# Patient Record
Sex: FEMALE | Race: WHITE | NOT HISPANIC OR LATINO | Employment: FULL TIME | URBAN - NONMETROPOLITAN AREA
[De-identification: names, ages, dates, MRNs, and addresses within clinical notes are randomized per-mention and may not be internally consistent; named-entity substitution may affect disease eponyms.]

---

## 2022-12-05 ENCOUNTER — OFFICE VISIT (OUTPATIENT)
Dept: OBGYN CLINIC | Facility: CLINIC | Age: 37
End: 2022-12-05

## 2022-12-05 DIAGNOSIS — M75.01 ADHESIVE CAPSULITIS OF RIGHT SHOULDER: ICD-10-CM

## 2022-12-05 DIAGNOSIS — M25.511 ACUTE PAIN OF RIGHT SHOULDER: Primary | ICD-10-CM

## 2022-12-05 RX ORDER — NAPROXEN 500 MG/1
500 TABLET ORAL 2 TIMES DAILY WITH MEALS
Qty: 60 TABLET | Refills: 1 | Status: SHIPPED | OUTPATIENT
Start: 2022-12-05

## 2022-12-05 RX ORDER — BUPIVACAINE HYDROCHLORIDE 2.5 MG/ML
4 INJECTION, SOLUTION INFILTRATION; PERINEURAL
Status: COMPLETED | OUTPATIENT
Start: 2022-12-05 | End: 2022-12-05

## 2022-12-05 RX ORDER — TRIAMCINOLONE ACETONIDE 40 MG/ML
40 INJECTION, SUSPENSION INTRA-ARTICULAR; INTRAMUSCULAR
Status: COMPLETED | OUTPATIENT
Start: 2022-12-05 | End: 2022-12-05

## 2022-12-05 RX ADMIN — TRIAMCINOLONE ACETONIDE 40 MG: 40 INJECTION, SUSPENSION INTRA-ARTICULAR; INTRAMUSCULAR at 14:56

## 2022-12-05 RX ADMIN — BUPIVACAINE HYDROCHLORIDE 4 ML: 2.5 INJECTION, SOLUTION INFILTRATION; PERINEURAL at 14:56

## 2022-12-05 NOTE — PROGRESS NOTES
ASSESSMENT/PLAN:    Diagnoses and all orders for this visit:    Acute pain of right shoulder  -     Ambulatory Referral to Physical Therapy; Future    Adhesive capsulitis of right shoulder  -     Ambulatory Referral to Physical Therapy; Future  -     naproxen (NAPROSYN) 500 mg tablet; Take 1 tablet (500 mg total) by mouth 2 (two) times a day with meals        Plan:  Treatment options were discussed  She elected to proceed with injection which was performed without difficulty  I have also recommended physical therapy and a prescription for naproxen was sent to her pharmacy  I will see her in 3-4 weeks for re-evaluation  I have encouraged her to contact me if questions or concerns arise  She will add Tylenol as needed  I did discuss the option of narcotic analgesics for prior to therapy sessions but she would prefer to avoid this at this time  Return for  3-4 weeks  _____________________________________________________  CHIEF COMPLAINT:  Chief Complaint   Patient presents with   • Right Shoulder - Pain         SUBJECTIVE:  Leslie Pimentel is a 44y o  year old right-handed female who presents for evaluation of acute onset of right shoulder pain about 3 weeks ago without injury  She has noted persistent symptoms  She denies any significant pain at rest but has pain with activity  She has had no treatment  She complains of anterior pain describing the pain as deep-seated  She denies radiation of pain distally and denies upper extremity paresthesias  She denies any history of previous shoulder complaints and denies any left shoulder complaints  PAST MEDICAL HISTORY:  Past Medical History:   Diagnosis Date   • Anxiety 2016   • Depression 2016   • GERD (gastroesophageal reflux disease) 2015       PAST SURGICAL HISTORY:  History reviewed  No pertinent surgical history      FAMILY HISTORY:  Family History   Problem Relation Age of Onset   • Hypertension Maternal Grandmother    • Diabetes Maternal Grandmother    • Thyroid disease Maternal Grandmother    • Breast cancer Maternal Grandmother    • Hypertension Maternal Grandfather    • Asthma Maternal Grandfather    • Cancer Maternal Grandfather    • Glaucoma Maternal Grandfather    • Thyroid disease Mother    • Asthma Sister        SOCIAL HISTORY:  Social History     Tobacco Use   • Smoking status: Never   • Smokeless tobacco: Never   Vaping Use   • Vaping Use: Never used   Substance Use Topics   • Alcohol use: Yes     Comment: Socially/infrequently   • Drug use: Never       MEDICATIONS:    Current Outpatient Medications:   •  DULoxetine HCl 30 MG CSDR, Take 30 mg by mouth daily, Disp: 90 capsule, Rfl: 1  •  levonorgestrel (MIRENA) 20 MCG/24HR IUD, 1 each by Intrauterine route, Disp: , Rfl:   •  naproxen (NAPROSYN) 500 mg tablet, Take 1 tablet (500 mg total) by mouth 2 (two) times a day with meals, Disp: 60 tablet, Rfl: 1    ALLERGIES:  No Known Allergies    Review of systems:   Constitutional: Negative for fatigue, fever or loss of apetite  HENT: Negative  Respiratory: Negative for shortness of breath, dyspnea  Cardiovascular: Negative for chest pain/tightness  Gastrointestinal: Negative for abdominal pain, N/V  Endocrine: Negative for cold/heat intolerance, unexplained weight loss/gain  Genitourinary: Negative for flank pain, dysuria, hematuria  Musculoskeletal:  Positive as in the HPI   Skin: Negative for rash  Neurological:  Negative  Psychiatric/Behavioral: Negative for agitation  _____________________________________________________  PHYSICAL EXAMINATION:    Blood pressure (P) 100/72, pulse (P) 66, temperature (P) 98 6 °F (37 °C), weight (P) 67 1 kg (148 lb)      General: well developed and well nourished, alert, oriented times 3 and appears comfortable  Psychiatric: Normal  HEENT: Benign  Cardiovascular: Regular    Pulmonary: No wheezing or stridor  Abdomen: Soft, Nontender  Skin: No masses, erythema, lacerations, fluctation, ulcerations  Neurovascular: Motor and sensory exams are intact and pulses are palpable  MUSCULOSKELETAL EXAMINATION:    The right shoulder exam demonstrates overall good range of motion but increased scapular excursion with shoulder abduction, forward flexion and rotation  She does complain of pain with abduction, forward flexion and with abduction/IR  Passively, she has no more than about 60 degrees of glenohumeral abduction, 70 degrees of forward flexion, 60 degrees of internal rotation and approximately 70 degrees of external rotation  She has pain with Eder's as well as with [place]'s testing  She has no tenderness over the acromioclavicular joint, rotator cuff or biceps  She demonstrates good strength of resisted range of motion despite pain  PROCEDURES:  Large joint arthrocentesis: R subacromial bursa  Universal Protocol:  Consent: Verbal consent obtained    Consent given by: patient  Patient understanding: patient states understanding of the procedure being performed    Supporting Documentation  Indications: pain   Procedure Details  Location: shoulder - R subacromial bursa  Needle size: 22 G  Ultrasound guidance: no  Approach: posterolateral  Medications administered: 4 mL bupivacaine 0 25 %; 40 mg triamcinolone acetonide 40 mg/mL              Ti Bolus

## 2022-12-08 ENCOUNTER — EVALUATION (OUTPATIENT)
Dept: PHYSICAL THERAPY | Facility: CLINIC | Age: 37
End: 2022-12-08

## 2022-12-08 DIAGNOSIS — M75.01 ADHESIVE CAPSULITIS OF RIGHT SHOULDER: ICD-10-CM

## 2022-12-08 DIAGNOSIS — M25.511 ACUTE PAIN OF RIGHT SHOULDER: Primary | ICD-10-CM

## 2022-12-08 NOTE — PROGRESS NOTES
PT Evaluation     Today's date: 2022  Patient name: Loi Pope  : 1985  MRN: [id number]  Referring provider: Shar Farris DO  Dx:   Encounter Diagnosis     ICD-10-CM    1  Adhesive capsulitis of right shoulder  M75 01                      Assessment  Assessment details: Lilia Coronel is a 44 y o female who present to OP PT with signs and symptoms consistent with R Shoulder adhesive capsulitis   Upon examination, PT notes key impairments of decreased ROM/strength in capsular pattern of limitations in shoulder ER, flexion and abduction  Due to this patient is limited with performing ADL/IADLs, reaching overhead, behind back, and lifting heavy objects  Additionally, patient is restricted in participating in attending the gym and recreational activities  Patient will benefit from skilled PT to address above impairments with POC consisting of functional ROM, stretching, strengthening, balance, analgesic modalities and manual therapy in order to maximize functional independence  Thank you for your referral!     Impairments: abnormal or restricted ROM, abnormal movement, impaired physical strength, lacks appropriate home exercise program, pain with function and poor body mechanics  Understanding of Dx/Px/POC: good   Prognosis: good    Goals  STG(In 4 weeks)  Patient will initiate HEP  Patient will decrease shoulder pain from 8/10 to 4/10 in order to improve QOL  Patient will increase shoulder flexion ROM  by 0-5 deg in order to reach shelf height  Patient will increase shoulder abduction ROM by 0-5 deg in order to reach shelf height  Patient will increase shoulder strength by 1 MMT in order to be able to perform ADL/IADLs  Patient will increase FOTO score from 47 to 59 in order to improve QOL  LTG(In 8 weeks)  Patient will decrease shoulder pain from 8/10 to 1/10 in order to improve QOL  Patient will increase shoulder flexion ROM by 5-10 deg in order to reach shelf height     Patient will increase shoulder abduction ROM by 5-10 deg in order to reach shelf height  Patient will increase shoulder strength by 1-2 MMT in order to be able to perform ADL/IADLs  Patient will increase FOTO score from 59 to 70 in order to improve QOL  Patient will be independent with HEP at D/c  Plan  Patient would benefit from: skilled physical therapy  Planned modality interventions: cryotherapy, electrical stimulation/Russian stimulation, TENS and thermotherapy: hydrocollator packs  Planned therapy interventions: home exercise program, joint mobilization, manual therapy, patient education, strengthening, stretching, therapeutic activities, therapeutic exercise and therapeutic training  Frequency: 2x week  Duration in weeks: 8  Treatment plan discussed with: patient        Subjective Evaluation    History of Present Illness  Mechanism of injury: Patient indicated that she began experiencing shoulder pain 3 weeks ago when she fell and hit her back  She stated that her shoulder progressively got worse over the past 3 weeks  She received a cortisone injection on 22 that relieved her symptoms and her shoulder ROM/strength has improved  Due to this she is still experiencing pain and difficulty reach overhead and behind her back              Quality of life: good    Pain  Current pain ratin  At best pain ratin  At worst pain ratin  Location: Anterior portion of right shoulder   Quality: discomfort and tight  Relieving factors: heat and medications  Aggravating factors: overhead activity and lifting    Social Support    Employment status: working (medical coding )    Diagnostic Tests  No diagnostic tests performed  Treatments  Previous treatment: injection treatment  Patient Goals  Patient goals for therapy: decreased pain, increased motion, increased strength and independence with ADLs/IADLs  Patient goal: To be able to reach overhead and behind her back without pain        Objective     Active Range of Motion   Left Shoulder   Flexion: WFL  Extension: WFL  Abduction: WFL  External rotation 0°: WFL  Internal rotation 0°: WFL  Internal rotation BTB: WFL    Right Shoulder   Flexion: WFL  Extension: WFL and with pain  Abduction: WFL and with pain  External rotation 90°: WFL and with pain    Left Elbow   Normal active range of motion    Right Elbow   Normal active range of motion    Additional Active Range of Motion Details  She experienced pain with shoulder extension, external rotation, and abduction but ROM WFL imitating capsular pattern  Scapular Mobility   Left Shoulder   Scapular mobility: good    Right Shoulder   Scapular mobility: good    Strength/Myotome Testing     Left Shoulder     Planes of Motion   Flexion: 5   Extension: 5   Abduction: 5   External rotation at 90°: 5   Internal rotation at 90°: 5     Right Shoulder     Planes of Motion   Flexion: 4-   Extension: 4- (Pain)   Abduction: 4- (Pain)   External rotation at 90°: 4- (Pain)   Internal rotation at 90°: 4-     Left Elbow   Flexion: 4+  Extension: 4+    Right Elbow   Flexion: 4+  Extension: 4+    Tests     Left Shoulder   Negative active compression ([place]), belly press, drop arm, empty can, [place]'s and lift-off  Right Shoulder   Negative active compression ([place]), belly press, drop arm, empty can, Hawkin's and lift-off                Precautions: N/A       Manuals 12/8            PROM: flex, abd, ER              Shoulder MREs                                       Neuro Re-Ed                                                                                                        Ther Ex             UBE              Pulley's              Pendulum's              Shoulder shrugs              Shoulder retractions              Isometrics              YTI towel wall slides             IR strap stretch                                                     HEP 15'            Ther Activity                                       Gait Training Modalities             P

## 2022-12-09 NOTE — PROGRESS NOTES
Daily Note     Today's date: 2022  Patient name: Salvatore Lopez  : 1985  MRN: [id number]  Referring provider: Andrea Hurtado DO  Dx:   Encounter Diagnosis     ICD-10-CM    1  Acute pain of right shoulder  M25 511       2  Adhesive capsulitis of right shoulder  M75 01                    Subjective:  Patient reports the shoulder is feeling unstable with overall weakness but minimal soreness to 2/10 level  Patient reports she is compliant with HEP  Objective: See treatment diary below      Assessment: Tolerated treatment well  PT notes start of POC with focus on scapular stability and manual therapy to decrease symptoms and improve functional limitations to meet therapy goals  PT notes continuation of decrease ROM and strength t/o the right shoulder and scapula with demonstration of impaired UB posture with need for continuation of skilled therapy  Plan: Continue per plan of care        Precautions: N/A       Manuals       PROM: flex, abd, ER   10 min with Scapular mobs       Shoulder MREs                        Neuro Re-Ed        S/L Scapular 4 way   2x10 Each   1 5#       Prone Row   2x10 4#       Prone I and Y   10x Each   1 5# Thumb up and       Ball Into wall   Shoulder and OH   2x10 Each   RFB      Wall Pushup +                         Ther Ex        UBE   10 min L1 0 Alt       Pulley's         Pendulum's         Shoulder shrugs         Shoulder retractions         Isometrics         YTI towel wall slides  IYT 10x Each       IR strap stretch                                 HEP 15' 10 min       Ther Activity                        Gait Training                        Modalities        MHP  15 min

## 2022-12-12 ENCOUNTER — OFFICE VISIT (OUTPATIENT)
Dept: PHYSICAL THERAPY | Facility: CLINIC | Age: 37
End: 2022-12-12

## 2022-12-12 DIAGNOSIS — M25.511 ACUTE PAIN OF RIGHT SHOULDER: Primary | ICD-10-CM

## 2022-12-12 DIAGNOSIS — M75.01 ADHESIVE CAPSULITIS OF RIGHT SHOULDER: ICD-10-CM

## 2022-12-15 ENCOUNTER — OFFICE VISIT (OUTPATIENT)
Dept: PHYSICAL THERAPY | Facility: CLINIC | Age: 37
End: 2022-12-15

## 2022-12-15 DIAGNOSIS — M25.511 ACUTE PAIN OF RIGHT SHOULDER: Primary | ICD-10-CM

## 2022-12-15 DIAGNOSIS — M75.01 ADHESIVE CAPSULITIS OF RIGHT SHOULDER: ICD-10-CM

## 2022-12-15 NOTE — PROGRESS NOTES
Daily Note     Today's date: 12/15/2022  Patient name: Dusty Sosa  : 1985  MRN: [id number]  Referring provider: Elizabeth Reardon DO  Dx:   Encounter Diagnosis     ICD-10-CM    1  Acute pain of right shoulder  M25 511       2  Adhesive capsulitis of right shoulder  M75 01                      Subjective:  Patient reports no issues after the last session and reports minimal soreness t/o the session  Objective: See treatment diary below      Assessment: Tolerated treatment well  PT notes continuation of decrease ROM and strength t/o the right shoulder and scapula with demonstration of impaired UB posture with need for continuation of skilled therapy  Plan: Continue per plan of care        Precautions: N/A       Manuals 12/8 12/12 12/15     PROM: flex, abd, ER   10 min with Scapular mobs  10 min scapular mobs      Shoulder MREs                        Neuro Re-Ed        S/L Scapular 4 way   2x10 Each   1 5#  2x10 Each   1 5#      Prone Row   2x10 4#  2x10 5#      Prone I and Y   10x Each   1 5# Thumb up and down  10x Each   1 5# Thumb up and down      Ball Into wall   Shoulder and OH   2x10 Each   RFB Shoulder and OH 2x10 Each RFB      Wall Pushup +         TB Bilateral ER and horizontal ABD    15x Each   Red              Ther Ex        UBE   10 min L1 0 Alt  10 min L1 0 Alt      Pulley's         Pendulum's         Shoulder shrugs         Shoulder retractions         Isometrics         YTI towel wall slides  IYT 10x Each  5X Bilat   Red   IYTA      IR strap stretch                                 HEP 15' 10 min       Ther Activity                        Gait Training                        Modalities        MHP  15 min  15 min

## 2022-12-20 NOTE — PROGRESS NOTES
Daily Note     Today's date: 2022  Patient name: Gomez Hughes  : 1985  MRN: [id number]  Referring provider: Carolina Briceño DO  Dx:   Encounter Diagnosis     ICD-10-CM    1  Acute pain of right shoulder  M25 511       2  Adhesive capsulitis of right shoulder  M75 01                      Subjective: Patient reports some mild soreness in her R shoulder, but says that she is tolerating her exercise program well  Objective: See treatment diary below      Assessment: Tolerated treatment well  Patient exhibited good technique with therapeutic exercises and would benefit from continued PT to increase R shoulder ROM/strength and endurance to improve mobility  Plan: Continue per plan of care        Precautions: N/A       Manuals 12/8 12/12 12/15 12/21    PROM: flex, abd, ER   10 min with Scapular mobs  10 min scapular mobs  10' scap mobs    Shoulder MREs                        Neuro Re-Ed        S/L Scapular 4 way   2x10 Each   1 5#  2x10 Each   1 5#  1 5#  2x10ea     Prone Row   2x10 4#  2x10 5#  5# 2x10    Prone I and Y   10x Each   1 5# Thumb up and down  10x Each   1 5# Thumb up and down  1 5#   10x ea  Thumb Up & Dwn    Ball Into wall   Shoulder and OH   2x10 Each   RFB Shoulder and OH 2x10 Each RFB  Shoulder & OH 10xea RWB    Wall Pushup +         TB Bilateral ER and horizontal ABD    15x Each   Red  15x ea RED            Ther Ex        UBE   10 min L1 0 Alt  10 min L1 0 Alt  10' 1 0alt    Pulley's         Pendulum's         Shoulder shrugs         Shoulder retractions         Isometrics         YTI towel wall slides  IYT 10x Each  5X Bilat   Red   IYTA  10x bilat IYTA    IR strap stretch         90/90 Tband ER/IR    10x ea RED    Shoulder Press w/ Resisted Flex/Ext    10x ea RED            HEP 15' 10 min       Ther Activity                        Gait Training                        Modalities        MHP  15 min  15 min  15'

## 2022-12-21 ENCOUNTER — OFFICE VISIT (OUTPATIENT)
Dept: PHYSICAL THERAPY | Facility: CLINIC | Age: 37
End: 2022-12-21

## 2022-12-21 DIAGNOSIS — M25.511 ACUTE PAIN OF RIGHT SHOULDER: Primary | ICD-10-CM

## 2022-12-21 DIAGNOSIS — M75.01 ADHESIVE CAPSULITIS OF RIGHT SHOULDER: ICD-10-CM

## 2022-12-22 ENCOUNTER — OFFICE VISIT (OUTPATIENT)
Dept: PHYSICAL THERAPY | Facility: CLINIC | Age: 37
End: 2022-12-22

## 2022-12-22 DIAGNOSIS — M75.01 ADHESIVE CAPSULITIS OF RIGHT SHOULDER: ICD-10-CM

## 2022-12-22 DIAGNOSIS — M25.511 ACUTE PAIN OF RIGHT SHOULDER: Primary | ICD-10-CM

## 2022-12-22 NOTE — PROGRESS NOTES
Daily Note     Today's date: 2022  Patient name: Stella Xie  : 1985  MRN: [id number]  Referring provider: Mague Kenney DO  Dx:   Encounter Diagnosis     ICD-10-CM    1  Acute pain of right shoulder  M25 511       2  Adhesive capsulitis of right shoulder  M75 01           Start Time: 1500  Stop Time: 1550  Total time in clinic (min): 50 minutes    Subjective: Patient indicated that her shoulder is feeling good today and she notices it has improved since starting therapy  Objective: See treatment diary below      Assessment:  Therapeutic exercise program was completed with good technique and no previous pain or symptoms  Continue to progress therapeutic exercise program as tolerated in order to achieve functional maximal independence  Plan: Continue per plan of care        Precautions: N/A       Manuals 12/8 12/12 12/15 12/21 12/22   PROM: flex, abd, ER   10 min with Scapular mobs  10 min scapular mobs  10' scap mobs 10' scap moves    Shoulder MREs                        Neuro Re-Ed       S/L Scapular 4 way   2x10 Each   1 5#  2x10 Each   1 5#  1 5#  2x10ea  1 5# 2x10 ea   Prone Row   2x10 4#  2x10 5#  5# 2x10 5# 2x10   Prone I and Y   10x Each   1 5# Thumb up and down  10x Each   1 5# Thumb up and down  1 5#   10x ea  Thumb Up & Dwn 2x10 1 5# ea thumb up and down    Ball Into wall   Shoulder and OH   2x10 Each   RFB Shoulder and OH 2x10 Each RFB  Shoulder & OH 10xea RWB Shoulder & OH 10xea RWB    Wall Pushup +         TB Bilateral ER and horizontal ABD    15x Each   Red  15x ea RED 15x ea RED           Ther Ex       UBE   10 min L1 0 Alt  10 min L1 0 Alt  10' 1 0alt 10' 1 0 alt    Pulley's         Pendulum's         Shoulder shrugs         Shoulder retractions         Isometrics         YTI towel wall slides  IYT 10x Each  5X Bilat   Red   IYTA  10x bilat IYTA 10x B/L IYTA   IR strap stretch         90/90 Tband ER/IR    10x ea RED 10x ea RED   Shoulder Press w/ Resisted Flex/Ext    10x ea RED 10x ea RED           HEP 15' 10 min       Ther Activity    12/21 12/22                   Gait Training    12/21 12/22                   Modalities    12/21 12/22   MHP  15 min  15 min  15' 15'

## 2022-12-23 NOTE — PROGRESS NOTES
Daily Note     Today's date: 2022  Patient name: Stella Xie  : 1985  MRN: [id number]  Referring provider: Mague Kenney DO  Dx:   Encounter Diagnosis     ICD-10-CM    1  Acute pain of right shoulder  M25 511       2  Adhesive capsulitis of right shoulder  M75 01                      Subjective: ***      Objective: See treatment diary below      Assessment: Tolerated treatment {Tolerated treatment :[id number]}  PT notes continuation of decrease strength and stability t/o the right shoulder and scapula with demonstration of impaired gait pattern and balance with need for continuation of skilled therapy  Plan: Continue per plan of care        Precautions: N/A       Manuals 12/12 12/15 12/21 12/22 12/27   PROM: flex, abd, ER  10 min with Scapular mobs  10 min scapular mobs  10' scap mobs 10' scap moves     Shoulder MREs                        Neuro Re-Ed       S/L Scapular 4 way  2x10 Each   1 5#  2x10 Each   1 5#  1 5#  2x10ea  1 5# 2x10 ea    Prone Row  2x10 4#  2x10 5#  5# 2x10 5# 2x10    Prone I and Y  10x Each   1 5# Thumb up and down  10x Each   1 5# Thumb up and down  1 5#   10x ea  Thumb Up & Dwn 2x10 1 5# ea thumb up and down     Ball Into wall  Shoulder and OH   2x10 Each   RFB Shoulder and OH 2x10 Each RFB  Shoulder & OH 10xea RWB Shoulder & OH 10xea RWB     Wall Pushup +         TB Bilateral ER and horizontal ABD   15x Each   Red  15x ea RED 15x ea RED            Ther Ex       UBE  10 min L1 0 Alt  10 min L1 0 Alt  10' 1 0alt 10' 1 0 alt     Pulley's         Pendulum's         Shoulder shrugs         Shoulder retractions         Isometrics         YTI towel wall slides IYT 10x Each  5X Bilat   Red   IYTA  10x bilat IYTA 10x B/L IYTA    IR strap stretch         90/90 Tband ER/IR   10x ea RED 10x ea RED    Shoulder Press w/ Resisted Flex/Ext   10x ea RED 10x ea RED            HEP 10 min        Ther Activity                       Gait Training 12/21 12/22                    [facility]   12/21 12/22    [facility] 15 min  15 min  15' 15'

## 2022-12-27 ENCOUNTER — OFFICE VISIT (OUTPATIENT)
Dept: PHYSICAL THERAPY | Facility: CLINIC | Age: 37
End: 2022-12-27

## 2022-12-27 DIAGNOSIS — M75.01 ADHESIVE CAPSULITIS OF RIGHT SHOULDER: ICD-10-CM

## 2022-12-27 DIAGNOSIS — M25.511 ACUTE PAIN OF RIGHT SHOULDER: Primary | ICD-10-CM

## 2022-12-27 NOTE — PROGRESS NOTES
Daily Note     Today's date: 2022  Patient name: Osmin Carver  : 1985  MRN: [id number]  Referring provider: Demetrius Ji DO  Dx:   Encounter Diagnosis     ICD-10-CM    1  Acute pain of right shoulder  M25 511       2  Adhesive capsulitis of right shoulder  M75 01           Start Time: 1501  Stop Time: 3668  Total time in clinic (min): 53 minutes    Subjective: Patient indicated that her shoulder was bothering her the past couple of days and she noticed it was tougher to reach overhead and behind her back  She stated that she goes to see Dr Lashon May for f/u visit tomorrow  Objective: See treatment diary below      Assessment: Therapeutic exercise program was completed with good technique and minor pain with end range shoulder ER during manuals and performing OH 90/90 ER exercise  Continue to progress patient as tolerated in order to achieve maximal functional independence  Plan: Continue per plan of care        Precautions: N/A       Manuals 12/27 12/12 12/15 12/21 12/22   PROM: flex, abd, ER  10' with scapular mobs 10 min with Scapular mobs  10 min scapular mobs  10' scap mobs 10' scap mobs    Shoulder MREs                        Neuro Re-Ed       S/L Scapular 4 way  2x10 2# 2x10 Each   1 5#  2x10 Each   1 5#  1 5#  2x10ea  1 5# 2x10 ea   Prone Row  2x10 5# 2x10 4#  2x10 5#  5# 2x10 5# 2x10   Prone I and Y  20x 2# ea thumb up and down  10x Each   1 5# Thumb up and down  10x Each   1 5# Thumb up and down  1 5#   10x ea  Thumb Up & Dwn 2x10 1 5# ea thumb up and down    Ball Into wall  Shoulder and OH 2x10 ea RFB Shoulder and OH   2x10 Each   RFB Shoulder and OH 2x10 Each RFB  Shoulder & OH 10xea RWB Shoulder & OH 10xea RWB    Wall Pushup +         TB Bilateral ER and horizontal ABD  15x ea Red  15x Each   Red  15x ea RED 15x ea RED           Ther Ex    UBE  10' L1 Alt 10 min L1 0 Alt  10 min L1 0 Alt  10' 1 0alt 10' 1 0 alt    Pulley's Pendulum's         Shoulder shrugs         Shoulder retractions         Isometrics         YTI towel wall slides IYT 10x B/L IYT 10x Each  5X Bilat   Red   IYTA  10x bilat IYTA 10x B/L IYTA   IR strap stretch         90/90 Tband ER/IR 10x ea RED   10x ea RED 10x ea RED   Shoulder Press w/ Resisted Flex/Ext 10x ea RED   10x ea RED 10x ea RED           HEP 15' 10 min       Ther Activity    12/21 12/22                   Gait Training 12/27 12/21 12/22                   Modalities    12/21 12/22   MHP 15' 15 min  15 min  15' 15'

## 2022-12-28 ENCOUNTER — OFFICE VISIT (OUTPATIENT)
Dept: OBGYN CLINIC | Facility: CLINIC | Age: 37
End: 2022-12-28

## 2022-12-28 VITALS
OXYGEN SATURATION: 94 % | HEIGHT: 69 IN | WEIGHT: 148 LBS | TEMPERATURE: 97.7 F | DIASTOLIC BLOOD PRESSURE: 72 MMHG | HEART RATE: 89 BPM | SYSTOLIC BLOOD PRESSURE: 104 MMHG | BODY MASS INDEX: 21.92 KG/M2

## 2022-12-28 DIAGNOSIS — M75.01 ADHESIVE CAPSULITIS OF RIGHT SHOULDER: Primary | ICD-10-CM

## 2022-12-28 DIAGNOSIS — M25.511 ACUTE PAIN OF RIGHT SHOULDER: ICD-10-CM

## 2022-12-28 NOTE — PROGRESS NOTES
ASSESSMENT/PLAN:    Problem List Items Addressed This Visit        Musculoskeletal and Integument    Adhesive capsulitis of right shoulder - Primary       Other    Acute pain of right shoulder       The patient notes significant relief of her symptoms following the cortisone injection and initiation of PT  The patient was instructed to continue working aggressively with PT and home exercises  She may continue with Tylenol/Naproxen as needed for pain relief  She will return to the office in 4 weeks for recheck  The patient was instructed to call or return sooner if any problems arise  Return in about 4 weeks (around 1/25/2023) for Recheck       _____________________________________________________  CHIEF COMPLAINT:  Chief Complaint   Patient presents with   • Right Shoulder - Follow-up         SUBJECTIVE:  Jong Solano is a 44 y o  female who presents for follow up of her right shoulder  At her last office visit on 12/5/2022 she received a cortisone injection  The patient states today that she is overall doing well  She has seen an improvement in her shoulder range of motion and pain since the injection and starting PT  She has gone to 6 sessions of PT so far, scheduled twice a week  The patient notes that she has had a few days of shoulder pain since her last visit, including yesterday, though today she has minimal pain  The patient denies any numbness or tingling, or swelling  She states that she typically has been taking the Naproxen before PT sessions  PAST MEDICAL HISTORY:  Past Medical History:   Diagnosis Date   • Anxiety 2016   • Depression 2016   • GERD (gastroesophageal reflux disease) 2015       PAST SURGICAL HISTORY:  History reviewed  No pertinent surgical history      FAMILY HISTORY:  Family History   Problem Relation Age of Onset   • Hypertension Maternal Grandmother    • Diabetes Maternal Grandmother    • Thyroid disease Maternal Grandmother    • Breast cancer Maternal Grandmother    • Hypertension Maternal Grandfather    • Asthma Maternal Grandfather    • Cancer Maternal Grandfather    • Glaucoma Maternal Grandfather    • Thyroid disease Mother    • Asthma Sister        SOCIAL HISTORY:  Social History     Tobacco Use   • Smoking status: Never   • Smokeless tobacco: Never   Vaping Use   • Vaping Use: Never used   Substance Use Topics   • Alcohol use: Yes     Comment: Socially/infrequently   • Drug use: Never       MEDICATIONS:    Current Outpatient Medications:   •  DULoxetine HCl 30 MG CSDR, Take 30 mg by mouth daily, Disp: 90 capsule, Rfl: 1  •  levonorgestrel (MIRENA) 20 MCG/24HR IUD, 1 each by Intrauterine route, Disp: , Rfl:   •  naproxen (NAPROSYN) 500 mg tablet, Take 1 tablet (500 mg total) by mouth 2 (two) times a day with meals, Disp: 60 tablet, Rfl: 1    ALLERGIES:  No Known Allergies    REVIEW OF SYSTEMS:  Pertinent items are noted in HPI    A comprehensive review of systems was negative       _____________________________________________________  PHYSICAL EXAMINATION:  General: well developed and well nourished, alert, oriented times 3 and appears comfortable  Psychiatric: Normal  HEENT:  Normocephalic, atraumatic  Cardiovascular:  Regular  Pulmonary: No wheezing or stridor  Skin: No masses, erthema, lacerations, fluctation, ulcerations  Neurovascular: strong distal pulses, sensory and motor testing intact     MUSCULOSKELETAL EXAMINATION:    Right shoulder:  - skin without lesions, ecchymosis, erythema, swelling, warmth, or other signs of infection   - the patient does admit to mild palpable tenderness along the anterior shoulder  - active shoulder ROM:    degrees    degrees   ER 70 degrees   IR0 to level of L3  - passive shoulder forward flexion to 100 degrees, abduction 100 degrees, ER 80 degrees  - full active ROM of the elbow, wrist, and digits without pain  - full supination and pronation without pain  - negative belly press, lift off tests  -  Pain throughout [facility] testing, patient initially expressed hesitancy to participate in testing due to fear of pain  - 5/5 strength resisted elbow flexion/extension  - 5/5 strength resisted supination and pronation without pain  - sensation and motor intact along the radial, median, and ulnar nerve distributions  - strong radial pulse  - brisk cap refill in all fingers       _____________________________________________________  STUDIES REVIEWED:  No new imaging performed today    PROCEDURES PERFORMED:   Procedures  None performed today            Ravinder Munroe PA-C

## 2022-12-29 ENCOUNTER — OFFICE VISIT (OUTPATIENT)
Dept: PHYSICAL THERAPY | Facility: CLINIC | Age: 37
End: 2022-12-29

## 2022-12-29 DIAGNOSIS — M75.01 ADHESIVE CAPSULITIS OF RIGHT SHOULDER: ICD-10-CM

## 2022-12-29 DIAGNOSIS — M25.511 ACUTE PAIN OF RIGHT SHOULDER: Primary | ICD-10-CM

## 2022-12-29 NOTE — PROGRESS NOTES
Daily Note     Today's date: 2022  Patient name: Stella Xie  : 1985  MRN: [id number]  Referring provider: Mague Kenney DO  Dx:   Encounter Diagnosis     ICD-10-CM    1  Acute pain of right shoulder  M25 511       2  Adhesive capsulitis of right shoulder  M75 01           Start Time:   Stop Time: 155  Total time in clinic (min): 53 minutes    Subjective: Patient indicated that she aw her PCP yesterday and they stated to continue therapy for 4 more weeks until she returns for re-evaluation  Objective: See treatment diary below      Assessment: New exercises of D2 shoulder flexion pattern, OH ball into wall at 90, and wall push-up + were instructed and performed during today's therapy session in order to improve OH mobility and strength  Patient completed therapeutic exercise program with good technique and no previous pain or symptoms  Continue to progress therapeutic exercise program as tolerated in order to achieve maximal functional independence  Plan: Continue per plan of care        Precautions: N/A       Manuals 12/27 12/29 12/15 12/21 12/22   PROM: flex, abd, ER  10' with scapular mobs 10 min with Scapular mobs  10 min scapular mobs  10' scap mobs 10' scap mobs    Shoulder MREs                        Neuro Re-Ed    S/L Scapular 4 way  2x10 2# 2x10 Each   2#  2x10 Each   1 5#  1 5#  2x10ea  1 5# 2x10 ea   Prone Row  2x10 5# 2x10 5#  2x10 5#  5# 2x10 5# 2x10   Prone I and Y  20x 2# ea thumb up and down  10x Each   2# Thumb up and down  10x Each   1 5# Thumb up and down  1 5#   10x ea  Thumb Up & Dwn 2x10 1 5# ea thumb up and down    Ball Into wall  Shoulder and OH 2x10 ea RFB Shoulder and OH   2x10 Each   RFB Shoulder and OH 2x10 Each RFB  Shoulder & OH 10xea RWB Shoulder & OH 10xea RWB    Wall Pushup +   20x      TB Bilateral ER and horizontal ABD  15x ea Red 3x10 ea Red  15x Each   Red  15x ea RED 15x ea RED   D2 flexion pattern  3x10 RTB Ther Ex 12/27 12/29 12/21 12/22   UBE  10' L1 Alt 10 min L1 0 Alt  10 min L1 0 Alt  10' 1 0alt 10' 1 0 alt    YTI towel wall slides IYT 10x B/L IYT 10x Each  5X Bilat   Red   IYTA  10x bilat IYTA 10x B/L IYTA   IR strap stretch         90/90 Tband ER/IR 10x ea RED 20x ea RED   10x ea RED 10x ea RED   Shoulder Press w/ Resisted Flex/Ext 10x ea RED 20x ea RED  10x ea RED 10x ea RED   Ther Activity  12/29 12/21 12/22                   Gait Training 12/27 12/29 12/21 12/22                   Modalities    12/21 12/22   MHP 15' 15 min  15 min  15' 15'

## 2023-01-03 ENCOUNTER — OFFICE VISIT (OUTPATIENT)
Dept: PHYSICAL THERAPY | Facility: CLINIC | Age: 38
End: 2023-01-03

## 2023-01-03 DIAGNOSIS — M75.01 ADHESIVE CAPSULITIS OF RIGHT SHOULDER: Primary | ICD-10-CM

## 2023-01-03 DIAGNOSIS — M25.511 ACUTE PAIN OF RIGHT SHOULDER: ICD-10-CM

## 2023-01-03 NOTE — PROGRESS NOTES
Daily Note     Today's date: 1/3/2023  Patient name: Dusty Sosa  : 1985  MRN: [id number]  Referring provider: Elizabeth Reardon DO  Dx:   Encounter Diagnosis     ICD-10-CM    1  Adhesive capsulitis of right shoulder  M75 01       2  Acute pain of right shoulder  M25 511                      Subjective: Patient noted no pain currently in her R shoulder  Objective: See treatment diary below      Assessment: Tolerated treatment fair  Patient was able to perform listed exercises  Patient noted discomfort  in R shoulder with 90/90 RTB  MHP applied at end of treatment  Patient would benefit from continued PT      Plan: Continue per plan of care        Precautions: N/A       Manuals 12/27 12/29 1/3/23  12/22   PROM: flex, abd, ER  10' with scapular mobs 10 min with Scapular mobs  10 min PROM  10' scap mobs    Shoulder MREs                        Neuro Re-Ed 12/27 12/29 1/3  12/22   S/L Scapular 4 way  2x10 2# 2x10 Each   2#  NV   1 5# 2x10 ea   Prone Row  2x10 5# 2x10 5#  2 x 10 5#  5# 2x10   Prone I and Y  20x 2# ea thumb up and down  10x Each   2# Thumb up and down  10x each 2# thumb up and down  2x10 1 5# ea thumb up and down    Ball Into wall  Shoulder and OH 2x10 ea RFB Shoulder and OH   2x10 Each   RFB Shoulder and OH   2x10 Each   RFB  Shoulder & OH 10xea RWB    Wall Pushup +   20x 20 x      TB Bilateral ER and horizontal ABD  15x ea Red 3x10 ea Red  3 x 10 ea Red   15x ea RED   D2 flexion pattern  3x10 RTB  3 x 10 RTB     Ther Ex 12/27 12/29 1/3/23  12/22   UBE  10' L1 Alt 10 min L1 0 Alt  10 min L 1 0 Alt  10' 1 0 alt    YTI towel wall slides IYT 10x B/L IYT 10x Each  IYT 10x Each   10x B/L IYTA   IR strap stretch         90/90 Tband ER/IR 10x ea RED 20x ea RED  20x ea RED   10x ea RED   Shoulder Press w/ Resisted Flex/Ext 10x ea RED 20x ea RED 20x ea RED  10x ea RED   Ther Activity                     Gait Training                    Modalities        MHP 15' 15 min  15 min  15'

## 2023-01-05 ENCOUNTER — EVALUATION (OUTPATIENT)
Dept: PHYSICAL THERAPY | Facility: CLINIC | Age: 38
End: 2023-01-05

## 2023-01-05 DIAGNOSIS — M75.01 ADHESIVE CAPSULITIS OF RIGHT SHOULDER: Primary | ICD-10-CM

## 2023-01-05 DIAGNOSIS — M25.511 ACUTE PAIN OF RIGHT SHOULDER: ICD-10-CM

## 2023-01-05 NOTE — PROGRESS NOTES
PT Re-Evaluation     Today's date: 2023  Patient name: Marybeth Mahan  : 1985  MRN: [id number]  Referring provider: David Whitman DO  Dx:   Encounter Diagnosis     ICD-10-CM    1  Adhesive capsulitis of right shoulder  M75 01       2  Acute pain of right shoulder  M25 511           Start Time: 1500  Stop Time: 1556  Total time in clinic (min): 56 minutes    Assessment  Assessment details: Gume Kohler is a 44 y o female who present to OP PT with signs and symptoms consistent with R Shoulder adhesive capsulitis   Upon re-examination, patient has attended 8 PT sessions and has shown improvement with both shoulder ROM and strength, especially with no pain during end range of shoulder ER, abduction, and flexion  Continue PT for 2 weeks in order to improve OH strengthening to achieve maximal functional independence  Impairments: abnormal or restricted ROM, abnormal movement, impaired physical strength, lacks appropriate home exercise program, pain with function and poor body mechanics  Understanding of Dx/Px/POC: good   Prognosis: good    Goals  STG(In 4 weeks)  Patient will initiate HEP  MET  Patient will decrease shoulder pain from 8/10 to 4/10 in order to improve QOL  MET  Patient will increase shoulder flexion ROM  by 0-5 deg in order to reach shelf height  MET  Patient will increase shoulder abduction ROM by 0-5 deg in order to reach shelf height  MET  Patient will increase shoulder strength by 1 MMT in order to be able to perform ADL/IADLs  Progressing  Patient will increase FOTO score from 47 to 59 in order to improve QOL  Progressing    LTG(In 8 weeks)  Patient will decrease shoulder pain from 8/10 to 1/10 in order to improve QOL  Progressing  Patient will increase shoulder flexion ROM by 5-10 deg in order to reach shelf height  MET  Patient will increase shoulder abduction ROM by 5-10 deg in order to reach shelf height   MET  Patient will increase shoulder strength by 1-2 MMT in order to be able to perform ADL/IADLs  Progressing  Patient will increase FOTO score from 59 to 70 in order to improve QOL  Progressing  Patient will be independent with HEP at D/c  Progressing         Plan  Patient would benefit from: skilled physical therapy  Planned modality interventions: cryotherapy, electrical stimulation/Russian stimulation, TENS and thermotherapy: hydrocollator packs  Planned therapy interventions: home exercise program, joint mobilization, manual therapy, patient education, strengthening, stretching, therapeutic activities, therapeutic exercise and therapeutic training  Frequency: 2x week  Duration in weeks: 8  Treatment plan discussed with: patient        Subjective Evaluation    History of Present Illness  Mechanism of injury: Patient indicated that she began experiencing shoulder pain 3 weeks ago when she fell and hit her back  She stated that her shoulder progressively got worse over the past 3 weeks  She received a cortisone injection on 22 that relieved her symptoms and her shoulder ROM/strength has improved  Due to this she is still experiencing pain and difficulty reach overhead and behind her back              Quality of life: good    Pain  Current pain ratin  At best pain ratin  At worst pain ratin  Location: Anterior portion of right shoulder   Quality: discomfort and tight  Relieving factors: heat and medications  Aggravating factors: overhead activity and lifting    Social Support    Employment status: working (medical coding )    Diagnostic Tests  No diagnostic tests performed  Treatments  Previous treatment: injection treatment  Patient Goals  Patient goals for therapy: decreased pain, increased motion, increased strength and independence with ADLs/IADLs  Patient goal: To be able to reach overhead and behind her back without pain        Objective     Active Range of Motion   Left Shoulder   Flexion: WFL  Extension: WFL  Abduction: [facility]  External rotation 0°: [facility]  Internal rotation 0°: [facility]  Internal rotation BTB: WFL    Right Shoulder   Flexion: WFL  Extension: WFL and with pain  Abduction: WFL and with pain  External rotation 90°: WFL and with pain    Left Elbow   Normal active range of motion    Right Elbow   Normal active range of motion    Additional Active Range of Motion Details  She experienced pain with shoulder extension, external rotation, and abduction but ROM WFL imitating capsular pattern  Scapular Mobility   Left Shoulder   Scapular mobility: good    Right Shoulder   Scapular mobility: good    Strength/Myotome Testing     Left Shoulder     Planes of Motion   Flexion: 5   Extension: 5   Abduction: 5   External rotation at 90°: 5   Internal rotation at 90°: 5     Right Shoulder     Planes of Motion   Flexion: 4-   Extension: 4- (Pain)   Abduction: 4- (Pain)   External rotation at 90°: 4- (Pain)   Internal rotation at 90°: 4-     Left Elbow   Flexion: 4+  Extension: 4+    Right Elbow   Flexion: 4+  Extension: 4+    Tests     Left Shoulder   Negative active compression ([place]), belly press, drop arm, empty can, [place] and lift-off  Right Shoulder   Negative active compression ([place]), belly press, drop arm, empty can, [place]kin's and lift-off                Precautions: N/A      Manuals 12/27 12/29 1/3/23  1/5/23 12/22   PROM: flex, abd, ER  10' with scapular mobs 10 min with Scapular mobs  10 min PROM  10' PROM  scap mobs  10' scap mobs    Shoulder MREs                                         Neuro Re-Ed 12/27 12/29 1/3  1/5/23 12/22   S/L Scapular 4 way  2x10 2# 2x10 Each   2#  NV   2x10 3#  1 5# 2x10 ea   Prone Row  2x10 5# 2x10 5#  2 x 10 5#  2x10 5# 5# 2x10   Prone I and Y  20x 2# ea thumb up and down  10x Each   2# Thumb up and down  10x each 2# thumb up and down  2x10 3# thumb up and down  2x10 1 5# ea thumb up and down    Ball Into wall  Shoulder and OH 2x10 ea RFB Shoulder and OH   2x10 Each   RFB Shoulder and OH   2x10 Each   RFB  Shoulder and OH 2x10 ea RFB Shoulder & OH 10xea RWB    Wall Pushup +    20x 20 x   20x     TB Bilateral ER and horizontal ABD  15x ea Red 3x10 ea Red  3 x 10 ea Red   3x10 GTB 15x ea RED   D2 flexion pattern   3x10 RTB  3 x 10 RTB  3x10 GTB     Wall walk-ups     4x GTB    Ther Ex 12/27 12/29 1/3/23  1/5/23 12/22   UBE  10' L1 Alt 10 min L1 0 Alt  10 min L 1 0 Alt  10' L1 Alt 10' 1 0 alt    YTI towel wall slides IYT 10x B/L IYT 10x Each  IYT 10x Each   NT 10x B/L IYTA   IR strap stretch              90/90 Tband ER/IR 10x ea RED 20x ea RED  20x ea RED   20x ea GTB 10x ea RED   Shoulder Press w/ Resisted Flex/Ext 10x ea RED 20x ea RED 20x ea RED  20x ea GTB 10x ea RED   Ther Activity   12/29  1/3  1/5 12/22                               Gait Training 12/27 12/29  1/3  1/5 12/22                               Modalities      1/3  1/5 12/22   MHP 15' 15 min  15 min   15'

## 2023-01-11 ENCOUNTER — OFFICE VISIT (OUTPATIENT)
Dept: PHYSICAL THERAPY | Facility: CLINIC | Age: 38
End: 2023-01-11

## 2023-01-11 DIAGNOSIS — M25.511 ACUTE PAIN OF RIGHT SHOULDER: ICD-10-CM

## 2023-01-11 DIAGNOSIS — M75.01 ADHESIVE CAPSULITIS OF RIGHT SHOULDER: Primary | ICD-10-CM

## 2023-01-11 NOTE — PROGRESS NOTES
Daily Note     Today's date: 2023  Patient name: Espinoza Sanchez  : 1985  MRN: [id number]  Referring provider: Miky Cordero DO  Dx:   Encounter Diagnosis     ICD-10-CM    1  Adhesive capsulitis of right shoulder  M75 01       2  Acute pain of right shoulder  M25 511           Start Time: 1502  Stop Time: 1600  Total time in clinic (min): 58 minutes    Subjective: Patient reports no significant changes since LV  Objective: See treatment diary below      Assessment: Tolerated treatment well  UBE utilized for strength and ROM  Occasional VCs and TCs utilized for increased scap stabilization  Patient demonstrates ROM WNL in all planes  Patient demonstrated fatigue post treatment, exhibited good technique with therapeutic exercises and would benefit from continued PT to improve function  Plan: Continue per plan of care        Precautions: N/A      Manuals 12/27 12/29 1/3/23  1/5/23 1/11/23   PROM: flex, abd, ER  10' with scapular mobs 10 min with Scapular mobs  10 min PROM  10' PROM  scap mobs  10]min PROM and Scap mobs (performed by PT)   Shoulder MREs                                      Neuro Re-Ed 12/27 12/29 1/3  1/5/23 1/11   S/L Scapular 4 way  2x10 2# 2x10 Each   2#  NV   2x10 3#  2x10 3# ea   Prone Row  2x10 5# 2x10 5#  2 x 10 5#  2x10 5#    Prone I and Y  20x 2# ea thumb up and down  10x Each   2# Thumb up and down  10x each 2# thumb up and down  2x10 3# thumb up and down  2x10 3# thumb up and down    Ball Into wall  Shoulder and OH 2x10 ea RFB Shoulder and OH   2x10 Each   RFB Shoulder and OH   2x10 Each   RFB  Shoulder and OH 2x10 ea RFB  Shoulder and OH 2x10 ea RFB   Wall Pushup +    20x 20 x   20x 20x   TB Bilateral ER and horizontal ABD  15x ea Red 3x10 ea Red  3 x 10 ea Red   3x10 GTB 3x10 GTB   D2 flexion pattern   3x10 RTB  3 x 10 RTB  3x10 GTB 3x10 GTB   Wall walk-ups     4x GTB 4x GTB   Ther Ex 12/27 12/29 1/3/23  1/5/23 1/11/23   UBE  10' L1 Alt 10 min L1 0 Alt  10 min L 1 0 Alt  10' L1 Alt 10' L1 Alt   YTI towel wall slides IYT 10x B/L IYT 10x Each  IYT 10x Each   NT IYT 10x Each    IR strap stretch             90/90 Tband ER/IR 10x ea RED 20x ea RED  20x ea RED   20x ea GTB  20x ea GTB   Shoulder Press w/ Resisted Flex/Ext 10x ea RED 20x ea RED 20x ea RED  20x ea GTB 20x ea GTB   Ther Activity   12/29  1/3  1/5 1/11/23                             Gait Training 12/27 12/29  1/3  1/5 1/11/23                             Modalities      1/3  1/5 1/11/23   MHP 15' 15 min  15 min

## 2023-01-12 ENCOUNTER — OFFICE VISIT (OUTPATIENT)
Dept: PHYSICAL THERAPY | Facility: CLINIC | Age: 38
End: 2023-01-12

## 2023-01-12 DIAGNOSIS — M25.511 ACUTE PAIN OF RIGHT SHOULDER: ICD-10-CM

## 2023-01-12 DIAGNOSIS — M75.01 ADHESIVE CAPSULITIS OF RIGHT SHOULDER: Primary | ICD-10-CM

## 2023-01-12 NOTE — PROGRESS NOTES
Daily Note     Today's date: 2023  Patient name: Loi Pope  : 1985  MRN: [id number]  Referring provider: Shar Farris DO  Dx:   Encounter Diagnosis     ICD-10-CM    1  Adhesive capsulitis of right shoulder  M75 01       2  Acute pain of right shoulder  M25 511           Start Time: 1500  Stop Time: 1558  Total time in clinic (min): 58 minutes    Subjective: Patient indicated that her shoulder is feeling good today and nothing new since last visit  Objective: See treatment diary below      Assessment: During today's therapy session a focus on overhead strengthening was performed by instructed [employer] on [place]  This exercise increased right low back  The rest of therapeutic exercise program was completed with good technique and no previous pain or symptoms  Continue to recommend PT in order achieve maximal functional independence  Plan: Continue per plan of care        Precautions: N/A      Manuals 1/12/23 12/29 1/3/23  1/5/23 1/11/23   PROM: flex, abd, ER  10' with scapular mobs 10 min with Scapular mobs  10 min PROM  10' PROM  scap mobs  10]min PROM and Scap mobs (performed by PT)   Shoulder MREs                                      Neuro Re-Ed 1/12/23 12/29 1/3  1/5/23 1/11   S/L Scapular 4 way  2x10 3# 2x10 Each   2#  NV   2x10 3#  2x10 3# ea   Prone Row  2x10 5# standing 2x10 5#  2 x 10 5#  2x10 5#    Prone I and Y  20x 3# ea thumb up and down  10x Each   2# Thumb up and down  10x each 2# thumb up and down  2x10 3# thumb up and down  2x10 3# thumb up and down    Ball Into wall  Shoulder and OH 2x10 ea RFB Shoulder and OH   2x10 Each   RFB Shoulder and OH   2x10 Each   RFB  Shoulder and OH 2x10 ea RFB  Shoulder and OH 2x10 ea RFB   Wall Pushup +   20x 20x 20 x   20x 20x   TB Bilateral ER and horizontal ABD  15x ea Red 3x10 ea Red  3 x 10 ea Red   3x10 GTB 3x10 GTB   D1/D2 flexion pattern  3x10 RTB  3x10 RTB  3 x 10 RTB  3x10 GTB 3x10 GTB   Wall walk-ups  4x GTB    4x GTB 4x GTB   Ther Ex 1/12 12/29 1/3/23  1/5/23 1/11/23   UBE  10' L1 Alt 10 min L1 0 Alt  10 min L 1 0 Alt  10' L1 Alt 10' L1 Alt   YTI towel wall slides IYT 10x B/L IYT 10x Each  IYT 10x Each   NT IYT 10x Each    IR strap stretch             90/90 Tband ER/IR 10x ea GTB 20x ea RED  20x ea RED   20x ea GTB  20x ea GTB   Shoulder Press w/ Resisted Flex/Ext 10x ea GTB 20x ea RED 20x ea RED  20x ea GTB 20x [facility]  press   10# 2x10       Ther Activity  1/12 12/29  1/3  1/5 1/11/23                             Gait Training 1/12 12/29  1/3  1/5 1/11/23                             Modalities  1/12    1/3  1/5 1/11/23   MHP 15' 15 min  15 min

## 2023-01-13 NOTE — PROGRESS NOTES
Daily Note     Today's date: 2023  Patient name: Cassidy Fagan  : 1985  MRN: [id number]  Referring provider: Manjeet Mitchell DO  Dx:   Encounter Diagnosis     ICD-10-CM    1  Adhesive capsulitis of right shoulder  M75 01       2  Acute pain of right shoulder  M25 511                      Subjective:  Patient reports the shoulder is feeling better with minimal to no limitations  Objective: See treatment diary below      Assessment: Tolerated treatment well  PT notes continuation of decrease strength and stability t/o the right shoulder and scapula with demonstration of impaired UB posture with need for continuation of skilled therapy  Plan: Continue per plan of care        Precautions: N/A      Manuals 23   PROM: flex, abd, ER  10' with scapular mobs 10 min with scapular mobs    10]min PROM and Scap mobs (performed by PT)   Shoulder MREs                             Neuro Re-Ed 23   S/L Scapular 4 way  2x10 3# 2x10 3#    2x10 3# ea   Prone Row  2x10 5#  2x15 5#       Prone I and Y  20x 3# ea thumb up and down  2x10 Each   3# Thumb up and knuckle up    2x10 3# thumb up and down    Ball Into wall  Shoulder and OH 2x10 ea RFB NT    Shoulder and OH 2x10 ea RFB   Wall Pushup +   20x 2x10    20x   TB Bilateral ER and horizontal ABD  15x ea Red 2x10 Each   Red    3x10 GTB   D1/D2 flexion pattern  3x10 RTB  Lutz Chop up and Down   10x Each Bilat 5#    3x10 GTB   Wall walk-ups  4x GTB  NT    4x GTB   Ther Ex 23   UBE  10' L1 Alt 10 min L1 5 Alt    10' L1 Alt   YTI towel wall slides IYT 10x B/L 10x Each   Red Bilat    IYT 10x Each    IR strap stretch          90/90 Tband ER/IR 10x ea GTB NT    20x ea GTB   Shoulder Press w/ Resisted Flex/Ext 10x ea GTB 10x Each   Green    20x ea GTB   [place]  press   10# 2x10 2x10   10#       Ther Activity  23                       Gait Training 23                 Modalities  1/12 1/11/23   [facility] 15' 15 min

## 2023-01-16 ENCOUNTER — OFFICE VISIT (OUTPATIENT)
Dept: PHYSICAL THERAPY | Facility: CLINIC | Age: 38
End: 2023-01-16

## 2023-01-16 DIAGNOSIS — M25.511 ACUTE PAIN OF RIGHT SHOULDER: ICD-10-CM

## 2023-01-16 DIAGNOSIS — M75.01 ADHESIVE CAPSULITIS OF RIGHT SHOULDER: Primary | ICD-10-CM

## 2023-01-18 NOTE — PROGRESS NOTES
Daily Note     Today's date: 2023  Patient name: Irving Earl  : 1985  MRN: [id number]  Referring provider: Kimmy Thacker DO  Dx:   Encounter Diagnosis     ICD-10-CM    1  Adhesive capsulitis of right shoulder  M75 01       2  Acute pain of right shoulder  M25 511                      Subjective:  Patient reports the shoulder is feeling good with minimal to no limitations with lifting and ADL  Objective: See treatment diary below      Assessment: Tolerated treatment well  PT notes continuation of decrease strength and stability t/o the right shoulder and scapula with demonstration of impaired UB posture with need for continuation of skilled therapy  Plan: Progress note during next visit        Precautions: N/A      Manuals 23     PROM: flex, abd, ER  10' with scapular mobs 10 min with scapular mobs  10 min with scapular mobs      Shoulder MREs                             Neuro Re-Ed 23       S/L Scapular 4 way  2x10 3# 2x10 3#  2x10 3#      Prone Row  2x10 5#  2x15 5#  2x15 5#      Prone I and Y  20x 3# ea thumb up and down  2x10 Each   3# Thumb up and knuckle up  2x10 Each   3# Thumb up and knuckle up     Ball Into wall  Shoulder and OH 2x10 ea RFB NT      Wall Pushup +   20x 2x10       TB Bilateral ER and horizontal ABD  15x ea Red 2x10 Each   Red  2x10 Each   Red      D1/D2 flexion pattern  3x10 RTB  [place] up and Down   10x Each Bilat 5#  10x Each   Bilat  10#      Wall walk-ups  4x GTB  NT       Ther Ex        UBE  10' L1 Alt 10 min L1 5 Alt  10 min L1 5 Alt      YTI towel wall slides IYT 10x B/L 10x Each   Red Bilat  10x Each   Red Bilat      IR strap stretch          90/90 Tband ER/IR 10x ea GTB NT      Shoulder Press w/ Resisted Flex/Ext 10x ea GTB 10x Each   [facility]  press   10# 2x10 2x10   10#  2x10   10#      Ther Activity                             Gait Training                            Modalities   P 15' 15 min  15 min

## 2023-01-19 ENCOUNTER — OFFICE VISIT (OUTPATIENT)
Dept: PHYSICAL THERAPY | Facility: CLINIC | Age: 38
End: 2023-01-19

## 2023-01-19 DIAGNOSIS — M25.511 ACUTE PAIN OF RIGHT SHOULDER: ICD-10-CM

## 2023-01-19 DIAGNOSIS — M75.01 ADHESIVE CAPSULITIS OF RIGHT SHOULDER: Primary | ICD-10-CM

## 2023-01-20 NOTE — PROGRESS NOTES
PT Re-Evaluation     Today's date: 2023  Patient name: Irving Earl  : 1985  MRN: [id number]  Referring provider: Kimmy Thacker DO  Dx:   Encounter Diagnosis     ICD-10-CM    1  Adhesive capsulitis of right shoulder  M75 01       2  Acute pain of right shoulder  M25 511                      Assessment  Assessment details: PT notes the patient with improved ROM and strength t/o the right shoulder and scapula with demonstration of improved UB posture so PT notes the patient has met all therapy goals and is ready for a transition to HEP  PT will continue with skilled therapy for 1 session to update/review HEP and then transition to HEP  Impairments: abnormal or restricted ROM, abnormal movement, impaired physical strength, lacks appropriate home exercise program, pain with function and poor body mechanics  Understanding of Dx/Px/POC: good   Prognosis: good    Goals  STG(In 4 weeks)  Patient will initiate HEP  MET  Patient will decrease shoulder pain from 8/10 to 4/10 in order to improve QOL  MET  Patient will increase shoulder flexion ROM  by 0-5 deg in order to reach shelf height  MET  Patient will increase shoulder abduction ROM by 0-5 deg in order to reach shelf height  MET  Patient will increase shoulder strength by 1 MMT in order to be able to perform ADL/IADLs  Progressing  Patient will increase FOTO score from 47 to 59 in order to improve QOL  Progressing    LTG(In 8 weeks)  Patient will decrease shoulder pain from 8/10 to 1/10 in order to improve QOL  Progressing  Patient will increase shoulder flexion ROM by 5-10 deg in order to reach shelf height  MET  Patient will increase shoulder abduction ROM by 5-10 deg in order to reach shelf height  MET  Patient will increase shoulder strength by 1-2 MMT in order to be able to perform ADL/IADLs  Progressing  Patient will increase FOTO score from 59 to 70 in order to improve QOL  Progressing  Patient will be independent with HEP at D/c  Progressing         Plan  Plan details: Transition to [facility]  Patient would benefit from: skilled physical therapy  Planned modality interventions: cryotherapy, electrical stimulation/Russian stimulation, TENS and thermotherapy: hydrocollator packs  Planned therapy interventions: home exercise program, joint mobilization, manual therapy, patient education, strengthening, stretching, therapeutic activities, therapeutic exercise and therapeutic training  Frequency: 2x week  Duration in weeks: 1  Treatment plan discussed with: patient        Subjective Evaluation    History of Present Illness  Mechanism of injury: Patient indicated that she began experiencing shoulder pain 3 weeks ago when she fell and hit her back  She stated that her shoulder progressively got worse over the past 3 weeks  She received a cortisone injection on 22 that relieved her symptoms and her shoulder ROM/strength has improved  Due to this she is still experiencing pain and difficulty reach overhead and behind her back  Presently the patient has attended multiple sessions of skilled therapy and feels % improvement since the start of therapy  Patient reports the shoulder is moving better with increase strength  Patient denies any pain or symptoms with no limitations with ADL, lifting and carrying  Patient reports she is happy with current status and feels she is ready for a transition to [facility]              Quality of life: good    Pain  Current pain ratin  At best pain ratin  At worst pain ratin  Location: Anterior portion of right shoulder   Quality: discomfort and tight  Relieving factors: heat and medications  Aggravating factors: overhead activity and lifting  Progression: improved    Social Support    Employment status: working (medical coding )    Diagnostic Tests  No diagnostic tests performed  Treatments  Previous treatment: injection treatment  Current treatment: physical therapy  Patient Goals  Patient goals for therapy: decreased pain, increased motion, increased strength and independence with ADLs/IADLs  Patient goal: To be able to reach overhead and behind her back without pain        Objective     Postural Observations  Seated posture: good  Standing posture: good        Neurological Testing     Reflexes   Left   Biceps (C5/C6): normal (2+)  Brachioradialis (C6): normal (2+)    Right   Biceps (C5/C6): normal (2+)  Brachioradialis (C6): normal (2+)    Active Range of Motion   Cervical/Thoracic Spine     Normal active range of motion  Left Shoulder   Flexion: WFL  Extension: WFL  Abduction: WFL  External rotation 0°: WFL  Internal rotation 0°: WFL  Internal rotation BTB: WFL    Right Shoulder   Flexion: WFL  Extension: WFL  Abduction: WFL  External rotation 90°: WFL  Internal rotation BTB: WFL    Left Elbow   Normal active range of motion    Right Elbow   Normal active range of motion    Scapular Mobility   Left Shoulder   Scapular Dyskinesis: none  Scapular mobility: WFL    Right Shoulder   Scapular Dyskinesis: none  Scapular mobility: WFL    Strength/Myotome Testing     Left Shoulder     Planes of Motion   Flexion: 5   Extension: 5   Abduction: 5   External rotation at 90°: 5   Internal rotation at 90°: 5     Right Shoulder   Normal muscle strength    Planes of Motion   Flexion: 5   Extension: 5 (Pain)   Abduction: 5 (Pain)   External rotation at 90°: 5 (Pain)   Internal rotation at 90°: 5     Left Elbow   Flexion: 5  Extension: 5    Right Elbow   Flexion: 5  Extension: 5    Tests     Left Shoulder   Negative active compression ([place]), belly press, drop arm, empty can, Hawkin's and lift-off  Right Shoulder   Negative active compression ([place]), belly press, drop arm, empty can, Hawkin's and lift-off                Precautions: N/A      Manuals 1/12/23 1/16 1/19 1/23    PROM: flex, abd, ER  10' with scapular mobs 10 min with scapular mobs  10 min with scapular mobs  DC    Shoulder MREs                       Neuro Re-Ed 1/12/23       S/L Scapular 4 way  2x10 3# 2x10 3#  2x10 3#  2x10 Each 3#     Prone Row  2x10 5#  2x15 5#  2x15 5#  2x15 5#     Prone I and Y  20x 3# ea thumb up and down  2x10 Each   3# Thumb up and knuckle up  2x10 Each   3# Thumb up and knuckle up 2x10 Each   3# Thumb up and knuckle up     Ball Into wall  Shoulder and OH 2x10 ea RFB NT  [place] Lat PD   20# 2x10     Wall Pushup +   20x 2x10   [place] Push/pull  2x10 Each   15#     TB Bilateral ER and horizontal ABD  15x ea Red 2x10 Each   Red  2x10 Each   Red  [place] incline press  15#   2x10     D1/D2 flexion pattern  3x10 RTB  [place] up and Down   10x Each Bilat 5#  10x Each   Bilat  10#  90/90 IR/ER   10x Each   Green     Wall walk-ups  4x GTB  NT       Ther Ex 1/12       UBE  10' L1 Alt 10 min L1 5 Alt  10 min L1 5 Alt  10 min   L2 0 Alt     YTI towel wall slides IYT 10x B/L 10x Each   Red Bilat  10x Each   Red Bilat  NT    IR strap stretch          90/90 Tband ER/IR 10x ea GTB NT      Shoulder Press w/ Resisted Flex/Ext 10x ea GTB 10x Each   [facility]  press   10# 2x10 2x10   10#  2x10   10#  2x10   13#     Ther Activity  1/12                           Gait Training 1/12                           Modalities  1/12       MHP 15' 15 min  15 min  15 min

## 2023-01-23 ENCOUNTER — EVALUATION (OUTPATIENT)
Dept: PHYSICAL THERAPY | Facility: CLINIC | Age: 38
End: 2023-01-23

## 2023-01-23 DIAGNOSIS — M75.01 ADHESIVE CAPSULITIS OF RIGHT SHOULDER: Primary | ICD-10-CM

## 2023-01-23 DIAGNOSIS — M25.511 ACUTE PAIN OF RIGHT SHOULDER: ICD-10-CM

## 2023-01-25 ENCOUNTER — OFFICE VISIT (OUTPATIENT)
Dept: OBGYN CLINIC | Facility: CLINIC | Age: 38
End: 2023-01-25

## 2023-01-25 VITALS
TEMPERATURE: 97.6 F | HEART RATE: 82 BPM | WEIGHT: 149 LBS | HEIGHT: 69 IN | BODY MASS INDEX: 22.07 KG/M2 | SYSTOLIC BLOOD PRESSURE: 100 MMHG | DIASTOLIC BLOOD PRESSURE: 68 MMHG

## 2023-01-25 DIAGNOSIS — G89.29 CHRONIC RIGHT-SIDED LOW BACK PAIN WITHOUT SCIATICA: ICD-10-CM

## 2023-01-25 DIAGNOSIS — M75.01 ADHESIVE CAPSULITIS OF RIGHT SHOULDER: Primary | ICD-10-CM

## 2023-01-25 DIAGNOSIS — M54.50 CHRONIC RIGHT-SIDED LOW BACK PAIN WITHOUT SCIATICA: ICD-10-CM

## 2023-01-25 NOTE — PROGRESS NOTES
ASSESSMENT/PLAN:    Diagnoses and all orders for this visit:    Adhesive capsulitis of right shoulder    Chronic right-sided low back pain without sciatica    Plan: At this time, I would recommend follow-up as needed  She anticipates discharge from physical therapy with a last session scheduled for tomorrow to reinforce her home exercise program   I have encouraged her to contact me if questions or concerns arise or if the shoulder begins to bother her again  In terms of her right sided lower back pain she certainly could continue chiropractic care although she indicates that she does not believe this has provided much benefit  A formal course of physical therapy would be reasonable  She plans to contact her family physician to try to obtain a prescription for physical therapy  Alternatively, she could see Dr Pierre Santiago or Dr Dafne Hicks       _____________________________________________________  CHIEF COMPLAINT:  Chief Complaint   Patient presents with   • Right Shoulder - Follow-up   • Follow-up         SUBJECTIVE:  Barbara Rodríguez is a 44y o  year old female who presents for follow up of adhesive capsulitis of the right shoulder  She was last seen in regards this issue on 12/20/2022, at which time she had been progressing very well with home exercise program and formal physical therapy  On today's presentation, she states that she has achieved all of her therapeutic goals, and is intending to be discharged in physical therapy within the week  She has been able to return to activities as desired without any limitations or restrictions  She denies any pain at this time  She also denies taking any medications for pain control  She denies any new onset bruising, swelling, numbness, tingling, feelings of instability      PAST MEDICAL HISTORY:  Past Medical History:   Diagnosis Date   • Anxiety 2016   • Depression 2016   • GERD (gastroesophageal reflux disease) 2015       PAST SURGICAL HISTORY:  History reviewed  No pertinent surgical history  FAMILY HISTORY:  Family History   Problem Relation Age of Onset   • Hypertension Maternal Grandmother    • Diabetes Maternal Grandmother    • Thyroid disease Maternal Grandmother    • Breast cancer Maternal Grandmother    • Hypertension Maternal Grandfather    • Asthma Maternal Grandfather    • Cancer Maternal Grandfather    • Glaucoma Maternal Grandfather    • Thyroid disease Mother    • Asthma Sister        SOCIAL HISTORY:  Social History     Tobacco Use   • Smoking status: Never   • Smokeless tobacco: Never   Vaping Use   • Vaping Use: Never used   Substance Use Topics   • Alcohol use: Yes     Comment: Socially/infrequently   • Drug use: Never       MEDICATIONS:    Current Outpatient Medications:   •  DULoxetine HCl 30 MG CSDR, Take 30 mg by mouth daily, Disp: 90 capsule, Rfl: 1  •  levonorgestrel (MIRENA) 20 MCG/24HR IUD, 1 each by Intrauterine route, Disp: , Rfl:   •  naproxen (NAPROSYN) 500 mg tablet, Take 1 tablet (500 mg total) by mouth 2 (two) times a day with meals, Disp: 60 tablet, Rfl: 1    ALLERGIES:  No Known Allergies    REVIEW OF SYSTEMS:  Pertinent items are noted in HPI    A comprehensive review of systems was negative       _____________________________________________________  PHYSICAL EXAMINATION:  General: well developed and well nourished, alert, oriented times 3 and appears comfortable  Psychiatric: Normal  HEENT: Benign  Cardiovascular: Normal rate  Pulmonary: No wheezing or stridor  Skin: No masses, erythema, lacerations, fluctation, ulcerations  Neurovascular: Palpable distal pulses, DTRs intact    MUSCULOSKELETAL EXAMINATION:  Right shoulder -   No anatomical deformity  Skin is warm and dry to touch with no signs of erythema, ecchymosis, or infection  No soft tissue swelling or effusion noted  No palpable crepitus with passive motion  No tenderness to palpation on exam  ROM FF 0° - 180°, ABD 0° - 180°, ER 0° - 90°, IR T8  MMT 5/5 throughout  - glenohumeral instability appreciated on exam  - Neer's, - Couch  - Speed's, - Yergason's  - empty can, - drop-arm, - belly press, - resisted external rotation, - lift-off  Demonstrates normal elbow, wrist, and finger motion  2+ distal radial pulse with brisk capillary refill to the fingers  Radial, median, and ulnar motor and sensory distributions intact  Sensation to light touch intact distally    _____________________________________________________  STUDIES REVIEWED:  No imaging reviewed this visit      PROCEDURES PERFORMED:  No procedures performed this visit        Scribe Attestation    I,:  Maria Del Rosario Taylor am acting as a scribe while in the presence of the attending physician :       I,:  Derick Jacobson personally performed the services described in this documentation    as scribed in my presence :

## 2023-01-25 NOTE — PROGRESS NOTES
PT Discharge    Today's date: 2023  Patient name: Gina Serrato  : 1985  MRN: [id number]  Referring provider: Bony Mullins DO  Dx:   Encounter Diagnosis     ICD-10-CM    1  Adhesive capsulitis of right shoulder  M75 01       2  Acute pain of right shoulder  M25 511                      Assessment  Assessment details: PT notes the patient with improved ROM and strength t/o the right shoulder and scapula with demonstration of improved UB posture so PT notes the patient has met all therapy goals and is ready for a transition to HEP  Impairments: abnormal or restricted ROM, abnormal movement, impaired physical strength, lacks appropriate home exercise program, pain with function and poor body mechanics  Understanding of Dx/Px/POC: good   Prognosis: good    Goals  STG(In 4 weeks)  Patient will initiate HEP  MET  Patient will decrease shoulder pain from 8/10 to 4/10 in order to improve QOL  MET  Patient will increase shoulder flexion ROM  by 0-5 deg in order to reach shelf height  MET  Patient will increase shoulder abduction ROM by 0-5 deg in order to reach shelf height  MET  Patient will increase shoulder strength by 1 MMT in order to be able to perform ADL/IADLs  Progressing  Patient will increase FOTO score from 47 to 59 in order to improve QOL  Progressing    LTG(In 8 weeks)  Patient will decrease shoulder pain from 8/10 to 1/10 in order to improve QOL  Progressing  Patient will increase shoulder flexion ROM by 5-10 deg in order to reach shelf height  MET  Patient will increase shoulder abduction ROM by 5-10 deg in order to reach shelf height  MET  Patient will increase shoulder strength by 1-2 MMT in order to be able to perform ADL/IADLs  Progressing  Patient will increase FOTO score from 59 to 70 in order to improve QOL  Progressing  Patient will be independent with HEP at D/c  Progressing         Plan  Plan details: Transition to HEP     Patient would benefit from: skilled physical therapy  Planned modality interventions: cryotherapy, electrical stimulation/Russian stimulation, TENS and thermotherapy: hydrocollator packs  Planned therapy interventions: home exercise program, joint mobilization, manual therapy, patient education, strengthening, stretching, therapeutic activities, therapeutic exercise and therapeutic training  Frequency: 2x week  Duration in weeks: 1  Treatment plan discussed with: patient        Subjective Evaluation    History of Present Illness  Mechanism of injury: Patient indicated that she began experiencing shoulder pain 3 weeks ago when she fell and hit her back  She stated that her shoulder progressively got worse over the past 3 weeks  She received a cortisone injection on 22 that relieved her symptoms and her shoulder ROM/strength has improved  Due to this she is still experiencing pain and difficulty reach overhead and behind her back  Presently the patient has attended multiple sessions of skilled therapy and feels % improvement since the start of therapy  Patient reports the shoulder is moving better with increase strength  Patient denies any pain or symptoms with no limitations with ADL, lifting and carrying  Patient reports she is happy with current status and feels she is ready for a transition to [facility]              Quality of life: good    Pain  Current pain ratin  At best pain ratin  At worst pain ratin  Location: Anterior portion of right shoulder   Quality: discomfort and tight  Relieving factors: heat and medications  Aggravating factors: overhead activity and lifting  Progression: improved    Social Support    Employment status: working (medical coding )    Diagnostic Tests  No diagnostic tests performed  Treatments  Previous treatment: injection treatment  Current treatment: physical therapy  Patient Goals  Patient goals for therapy: decreased pain, increased motion, increased strength and independence with ADLs/IADLs  Patient goal: To be able to reach overhead and behind her back without pain        Objective     Postural Observations  Seated posture: good  Standing posture: good        Neurological Testing     Reflexes   Left   Biceps (C5/C6): normal (2+)  Brachioradialis (C6): normal (2+)    Right   Biceps (C5/C6): normal (2+)  Brachioradialis (C6): normal (2+)    Active Range of Motion   Cervical/Thoracic Spine     Normal active range of motion  Left Shoulder   Flexion: WFL  Extension: WFL  Abduction: WFL  External rotation 0°: WFL  Internal rotation 0°: WFL  Internal rotation BTB: WFL    Right Shoulder   Flexion: WFL  Extension: WFL  Abduction: WFL  External rotation 90°: WFL  Internal rotation BTB: WFL    Left Elbow   Normal active range of motion    Right Elbow   Normal active range of motion    Scapular Mobility   Left Shoulder   Scapular Dyskinesis: none  Scapular mobility: WFL    Right Shoulder   Scapular Dyskinesis: none  Scapular mobility: WFL    Strength/Myotome Testing     Left Shoulder     Planes of Motion   Flexion: 5   Extension: 5   Abduction: 5   External rotation at 90°: 5   Internal rotation at 90°: 5     Right Shoulder   Normal muscle strength    Planes of Motion   Flexion: 5   Extension: 5 (Pain)   Abduction: 5 (Pain)   External rotation at 90°: 5 (Pain)   Internal rotation at 90°: 5     Left Elbow   Flexion: 5  Extension: 5    Right Elbow   Flexion: 5  Extension: 5    Tests     Left Shoulder   Negative active compression ([place]), belly press, drop arm, empty can, Hawkin's and lift-off  Right Shoulder   Negative active compression ([place]), belly press, drop arm, empty can, Hawkin's and lift-off                Precautions: N/A      Manuals 1/12/23 1/16 1/19 1/23 1/26   PROM: flex, abd, ER  10' with scapular mobs 10 min with scapular mobs  10 min with scapular mobs  DC    Shoulder MREs                             Neuro Re-Ed 1/12/23       S/L Scapular 4 way  2x10 3# 2x10 3#  2x10 3#  2x10 Each 3#     Prone Row 2x10 5#  2x15 5#  2x15 5#  2x15 5#     Prone I and Y  20x 3# ea thumb up and down  2x10 Each   3# Thumb up and knuckle up  2x10 Each   3# Thumb up and knuckle up 2x10 Each   3# Thumb up and knuckle up     Ball Into wall  Shoulder and OH 2x10 ea RFB NT  [place] Lat PD   20# 2x10     Wall Pushup +   20x 2x10   [place] Push/pull  2x10 Each   15#     TB Bilateral ER and horizontal ABD  15x ea Red 2x10 Each   Red  2x10 Each   Red  [place] incline press  15#   2x10     D1/D2 flexion pattern  3x10 RTB  [place] up and Down   10x Each Bilat 5#  10x Each   Bilat  10#  90/90 IR/ER   10x Each   Green     Wall walk-ups  4x GTB  NT       Ther Ex 1/12       UBE  10' L1 Alt 10 min L1 5 Alt  10 min L1 5 Alt  10 min   L2 0 Alt  10 min  L2 0 Alt    YTI towel wall slides IYT 10x B/L 10x Each   Red Bilat  10x Each   Red Bilat  NT    IR strap stretch          90/90 Tband ER/IR 10x ea GTB NT      Shoulder Press w/ Resisted Flex/Ext 10x ea GTB 10x Each   [place]  press   10# 2x10 2x10   10#  2x10   10#  2x10   13#     Ther Activity  1/12        HEP update/review       15 min              Gait Training 1/12                           Modalities  1/12       MHP 15' 15 min  15 min  15 min  Declined

## 2023-01-26 ENCOUNTER — OFFICE VISIT (OUTPATIENT)
Dept: PHYSICAL THERAPY | Facility: CLINIC | Age: 38
End: 2023-01-26

## 2023-01-26 DIAGNOSIS — M75.01 ADHESIVE CAPSULITIS OF RIGHT SHOULDER: Primary | ICD-10-CM

## 2023-01-26 DIAGNOSIS — M25.511 ACUTE PAIN OF RIGHT SHOULDER: ICD-10-CM

## 2023-01-31 NOTE — PROGRESS NOTES
PT Evaluation     Today's date: 2023  Patient name: Edmond Waldrop  : 1985  MRN: [id number]  Referring provider: Jamila Waddell , PT  Dx:   Encounter Diagnosis     ICD-10-CM    1  Lumbar radiculopathy  M54 16                      Assessment  Assessment details: PT notes the patient with decrease ROM and strength t/o the lumbar spine with trunk/core weakness with + LLD leading to increase pain levels and functional limitations with need for course of skilled therapy for 3 weeks with focus on lumbar stabilization, manual therapy, posture, analgesic modalities, and HEP  PT notes posterior correction of the right innominate was applied during the MT with normal pelvis and no LLD post session  Impairments: abnormal or restricted ROM, activity intolerance, impaired physical strength, lacks appropriate home exercise program and pain with function  Other impairment: + LLD   Understanding of Dx/Px/POC: excellent  Goals  ST  Initiate HEP  2  Decrease pain levels by 25-50%   3  Increase ROM to WNL t/o the lumbar spine   4  Increase strength by 1-2 mm grades  5  No LLD with normal pelvis   LT  Improve spinal stability with increase trunk/core strength   2  Decrease limitations with standing, walking and stairs  3  Decrease limitations with trunk movement, lifting and carrying  4  Decrease limitations with transfers and ADL  5   DC with HEP    Plan  Plan details: PT notes review of POC and findings with the patient who is in agreement with PT recommendations of course of skilled therapy     Patient would benefit from: PT eval and skilled physical therapy  Planned modality interventions: thermotherapy: hydrocollator packs  Planned therapy interventions: manual therapy, neuromuscular re-education, patient education, self care, strengthening, stretching, therapeutic exercise, home exercise program and graded exercise  Frequency: 2x week  Duration in weeks: 3  Treatment plan discussed with: patient        Subjective Evaluation    History of Present Illness  Mechanism of injury: Patient reports development of LBP about 2 5 months after trip down 2 steps with twisting motion  Patient reports over time the symptoms have stayed about the same but continuation of limitations with trunk movement, push/pull, sleep, transfers, lifting, carrying and ADL  Patient contacted OPPT about setting up DA evaluation  Patient reports she trailed several sessions of chriopractic treatment with no change in status  Patient denies any denies any LE symptoms  Patient reports she is employed [employer] as  with significant PMH of GERD  Pain  Current pain ratin  At best pain ratin  At worst pain ratin  Location: Right Lumbar Spine   Quality: pulling, sharp and tight  Relieving factors: change in position  Aggravating factors: lifting, stair climbing, walking and standing    Treatments  Current treatment: physical therapy  Patient Goals  Patient goals for therapy: decreased pain, increased motion, increased strength, independence with ADLs/IADLs and return to sport/leisure activities          Objective     Postural Observations  Seated posture: fair  Standing posture: fair        Palpation   Left   Muscle spasm in the erector spinae and lumbar paraspinals  Tenderness of the erector spinae and lumbar paraspinals  Right   Muscle spasm in the erector spinae, iliopsoas and lumbar paraspinals  Tenderness of the erector spinae, iliopsoas and lumbar paraspinals  Tenderness     Lumbar Spine  Tenderness in the spinous process  Left Hip   No tenderness in the PSIS, greater trochanter and sacroiliac joint  Right Hip   Tenderness in the PSIS, greater trochanter and sacroiliac joint       Neurological Testing     Reflexes   Left   Patellar (L4): trace (1+)  Achilles (S1): trace (1+)    Right   Patellar (L4): trace (1+)  Achilles (S1): trace (1+)    Active Range of Motion Lumbar   Flexion:  WFL  Extension: 11 degrees  with pain  Left lateral flexion:  WFL  Right lateral flexion:  WFL and with pain  Left rotation:  WFL  Right rotation:  WFL and with pain  Left Hip   Normal active range of motion    Right Hip   Normal active range of motion  Left Knee   Normal active range of motion    Right Knee   Normal active range of motion    Additional Active Range of Motion Details  PT notes decrease lumbar extension with trunk/core weakness with abdominals of 3+/5 and lumbar paraspinals of 4/5     Joint Play     Hypomobile: L4, L5 and S1     Pain: L4, L5 and S1     Strength/Myotome Testing     Left Hip   Normal muscle strength    Right Hip   Planes of Motion   Flexion: 4+  Extension: 5  Abduction: 4+  Adduction: 5  External rotation: 4  Internal rotation: 4+    Left Knee   Normal strength    Right Knee   Normal strength    Tests     Left Hip   Negative ENRIKE, FADIR and long sit  Chuck: Negative  90/90 SLR: Negative  SLR: Negative  Right Hip   Positive long sit  Negative ENRIKE and FADIR  Chuck: Positive  90/90 SLR: Negative  SLR: Negative       Additional Tests Details  PT notes + LLD with anterior rotation of right innominate              Precautions:  + LLD       Manuals 2/2       Right Innominate Posterior correction  15 min                                Neuro Re-Ed        Bridge and with ABD         Supine Tball ABD crunch         BOSU Bridge         BOSU march         BOSU SLR  3 way         BOSU Squat         PPU with exhale         Ther Ex        Treadmill                                                         HEP update/review  10 min        Ther Activity                        Gait Training                        Modalities        MHP  PRN

## 2023-02-02 ENCOUNTER — OFFICE VISIT (OUTPATIENT)
Dept: PHYSICAL THERAPY | Facility: CLINIC | Age: 38
End: 2023-02-02

## 2023-02-02 DIAGNOSIS — M54.16 LUMBAR RADICULOPATHY: Primary | ICD-10-CM

## 2023-02-03 NOTE — PROGRESS NOTES
Daily Note     Today's date: 2023  Patient name: Jessa Shipman  : 1985  MRN: [id number]  Referring provider: Ennis Kanner , PT  Dx:   Encounter Diagnosis     ICD-10-CM    1  Lumbar radiculopathy  M54 16                      Subjective:  Patient reports the back is moving better but continuation of soreness to 3/10 level in the lumbar spine  Patient reports she is compliant with HEP  Objective: See treatment diary below      Assessment: Tolerated treatment well  PT notes start of POC with focus on lumbar stabilization and manual therapy to decrease pain levels and improve functional limitations to meet therapy goals  PT notes continuation of decrease ROM and strength t/o the lumbar spine with trunk/core weakness with need for continuation of skilled therapy  Plan: Continue per plan of care        Precautions:  + LLD       Manuals 2/2 2/6      Right Innominate Posterior correction  15 min  NT       Lumbar PA mobs with bilateral piriformis and hip flexor stretch   10 min       STM to the right lower lumbar paraspinals   5 min               Neuro Re-Ed        Bridge and with ABD   2x10 Green       Supine Tball ABD crunch         BOSU Bridge         BOSU march         BOSU SLR  3 way         BOSU Squat         PPU with exhale   10x3" Exhale       Ther Ex        Treadmill   10 min 1 5-2 5 MPH       Prone OAL   10x Bilat       S/L Clam shells   2x10 Bilat  Green       Supine Tball LTR  10x5" Hold                               HEP update/review  10 min        Ther Activity                        Gait Training                        Modalities        MHP  PRN

## 2023-02-06 ENCOUNTER — OFFICE VISIT (OUTPATIENT)
Dept: PHYSICAL THERAPY | Facility: CLINIC | Age: 38
End: 2023-02-06

## 2023-02-06 DIAGNOSIS — M54.16 LUMBAR RADICULOPATHY: Primary | ICD-10-CM

## 2023-02-09 ENCOUNTER — OFFICE VISIT (OUTPATIENT)
Dept: PHYSICAL THERAPY | Facility: CLINIC | Age: 38
End: 2023-02-09

## 2023-02-09 DIAGNOSIS — M54.16 LUMBAR RADICULOPATHY: Primary | ICD-10-CM

## 2023-02-09 NOTE — PROGRESS NOTES
Daily Note     Today's date: 2023  Patient name: Kathie Jarrell  : 1985  MRN: [id number]  Referring provider: Shawn Griffith PT  Dx:   Encounter Diagnosis     ICD-10-CM    1  Lumbar radiculopathy  M54 16           Start Time:   Stop Time: 6750  Total time in clinic (min): 58 minutes    Subjective:  Patient indicated that she feels as if her back has more mobility but she still has pain in it  Objective: See treatment diary below    Assessment: Therapeutic exercise program was completed with good technique and no previous pain or symptoms  Continue to recommend PT in order achieve maximal functional independence  Plan: Continue per plan of care        Precautions:  + LLD       Manuals      Right Innominate Posterior correction  15 min  NT  NT     Lumbar PA mobs with bilateral piriformis and hip flexor stretch   10 min  10'     STM to the right lower lumbar paraspinals   5 min  5'             Neuro Re-Ed        Bridge and with ABD   2x10 Green  2x10 GTB     Supine Tball ABD crunch         BOSU Bridge         BOSU march         BOSU SLR  3 way         BOSU Squat         PPU with exhale   10x3" Exhale  10x3" exhale     Ther Ex        Treadmill   10 min 1 5-2 5 MPH  10' 1 5-2 5 mph     Prone OAL   10x Bilat  10x B/L     S/L Clam shells   2x10 Bilat  Green  2x10 b/l green     Supine Tball LTR  10x5" Hold  10x5" hold                             HEP update/review  10 min        Ther Activity                        Gait Training                        Modalities        MHP  PRN

## 2023-02-10 NOTE — PROGRESS NOTES
Daily Note     Today's date: 2023  Patient name: Maria Alejandra Riggins  : 1985  MRN: [id number]  Referring provider: Lv Ferrell , PT  Dx:   Encounter Diagnosis     ICD-10-CM    1  Lumbar radiculopathy  M54 16                      Subjective:  Patient reports       Objective: See treatment diary below      Assessment: Tolerated treatment well  PT notes continuation of decrease ROM and strength t/o the lumbar spine with trunk/core strength with need for continuation of skilled therapy  PT notes the patient with posterior rotation of the right innominate with need for anterior correction with normal pelvis and no LLD post session  Plan: Continue per plan of care        Precautions:  + LLD       Manuals     Right Innominate Posterior correction  15 min  NT  NT 5 min right Anterior correction     Lumbar PA mobs with bilateral piriformis and hip flexor stretch   10 min  10' 5 min     STM to the right lower lumbar paraspinals   5 min  5' 5 min with Bilat Lumbar Rolls             Neuro Re-Ed        Bridge and with ABD   2x10 Green  2x10 GTB 2x10 Green     Supine Tball ABD crunch         BOSU Bridge         BOSU march         BOSU SLR  3 way         BOSU Squat         PPU with exhale   10x3" Exhale  10x3" exhale 10x3" Exhale     Ther Ex        Treadmill   10 min 1 5-2 5 MPH  10' 1 5-2 5 mph 10 min   1 5-2 5 MPH     Prone OAL   10x Bilat  10x B/L 10x Bilat     S/L Clam shells   2x10 Bilat  Green  2x10 b/l green 2x10 Bilat   Green     Supine Tball LTR  10x5" Hold  10x5" hold 10x5" Hold Bilat     S/L [facility] with PT OP     3x15" Hold Bilat                     HEP update/review  10 min        Ther Activity                        Gait Training                        Modalities        MHP  PRN    15 min in seated

## 2023-02-13 ENCOUNTER — OFFICE VISIT (OUTPATIENT)
Dept: PHYSICAL THERAPY | Facility: CLINIC | Age: 38
End: 2023-02-13

## 2023-02-13 DIAGNOSIS — M54.16 LUMBAR RADICULOPATHY: Primary | ICD-10-CM

## 2023-02-16 ENCOUNTER — OFFICE VISIT (OUTPATIENT)
Dept: PHYSICAL THERAPY | Facility: CLINIC | Age: 38
End: 2023-02-16

## 2023-02-16 DIAGNOSIS — M54.16 LUMBAR RADICULOPATHY: Primary | ICD-10-CM

## 2023-02-16 NOTE — PROGRESS NOTES
Daily Note     Today's date: 2023  Patient name: Maria Alejandra Riggins  : 1985  MRN: [id number]  Referring provider: Lv Ferrell , PT  Dx:   Encounter Diagnosis     ICD-10-CM    1  Lumbar radiculopathy  M54 16                      Subjective: Pt reports consistent symptoms in R side low back      Objective: See treatment diary below      Assessment:  Pt tolerated treatment session fairly well  Pain free hypomobility noted of lumbar spine; discomfort noted with sacral mobility  (-) LLD  Responds well with gapping techniques to SIJ; attempt to use gaping techniques in order to improve pain levels and then reintegrate compressive force exercises  Pt would benefit from continued OP PT services  Plan: Continue per plan of care        Precautions:  + LLD       Manuals     Right Innominate Posterior correction   NT  NT 5 min right Anterior correction     Lumbar PA mobs with bilateral piriformis and hip flexor stretch   10 min  10' 5 min  10 min   STM to the right lower lumbar paraspinals   5 min  5' 5 min with Bilat Lumbar Rolls  5 min           Neuro Re-Ed        Bridge and with ABD   2x10 Green  2x10 GTB 2x10 Green  2x10 Green    Supine Tball ABD crunch         BOSU Bridge         BOSU march         BOSU SLR  3 way         BOSU Squat         PPU with exhale  Hold 10x3" Exhale  10x3" exhale 10x3" Exhale  10x3" Exhale    Ther Ex        Treadmill   10 min 1 5-2 5 MPH  10' 1 5-2 5 mph 10 min   1 5-2 5 MPH  10 min   1 5-2 5 MPH    Prone OAL   10x Bilat  10x B/L 10x Bilat  NT   S/L Clam shells   2x10 Bilat  Green  2x10 b/l green 2x10 Bilat   Green  2x10 Bilat   Green    Supine Tball LTR  10x5" Hold  10x5" hold 10x5" Hold Bilat  10x5" Hold Bilat    S/L [facility] with PT OP     3x15" Hold Bilat  Scissor foam ball 10x :03 bilat   Leg press DL     75# 2x10           HEP update/review         Ther Activity                        Gait Training                        Modalities        MHP     15 min in seated

## 2023-02-17 NOTE — PROGRESS NOTES
Daily Note     Today's date: 2023  Patient name: Marybeth Mahan  : 1985  MRN: [id number]  Referring provider: Manolo Byrd , PT  Dx:   Encounter Diagnosis     ICD-10-CM    1  Lumbar radiculopathy  M54 16                      Subjective: Patient reports that her back has been popping a lot lately  Objective: See treatment diary below      Assessment: Tolerated treatment well  PT notes continuation of decrease ROM and strength t/o the lumbar spine with trunk/core weakness with need for continuation of skilled therapy  Plan: Progress note during next visit  Precautions:  + LLD       Manuals    Right Innominate Posterior correction  NT  NT 5 min right Anterior correction   5'BM   Lumbar PA mobs with bilateral piriformis and hip flexor stretch  10 min  10' 5 min  10 min 5'MZ   STM to the right lower lumbar paraspinals  5 min  5' 5 min with Bilat Lumbar Rolls  5 min 5'BM           Neuro Re-Ed        Bridge and with ABD  2x10 Green  2x10 GTB 2x10 Green  2x10 Green  2x10 GTB   Supine Tball ABD crunch         BOSU Bridge         BOSU march         BOSU SLR  3 way         BOSU Squat         PPU with exhale  10x3" Exhale  10x3" exhale 10x3" Exhale  10x3" Exhale  NT   Ther Ex        Treadmill  10 min 1 5-2 5 MPH  10' 1 5-2 5 mph 10 min   1 5-2 5 MPH  10 min   1 5-2 5 MPH  10' 1 5-  2  5MPH   Prone OAL  10x Bilat  10x B/L 10x Bilat  NT 10x bilat   S/L Clam shells  2x10 Bilat  Green  2x10 b/l green 2x10 Bilat   Green  2x10 Bilat   Green  [id number] GTB   Supine Tball LTR 10x5" Hold  10x5" hold 10x5" Hold Bilat  10x5" Hold Bilat  10x5" bilat   S/L [facility] with PT OP    3x15" Hold Bilat  Scissor foam ball 10x :03 bilat Scissor foam ball 10x3"bilat   Leg press DL    75# 2x10 75# 2x10           HEP update/review         Ther Activity                        Gait Training                        Modalities        MHP    15 min in seated   15' in seated

## 2023-02-20 ENCOUNTER — OFFICE VISIT (OUTPATIENT)
Dept: PHYSICAL THERAPY | Facility: CLINIC | Age: 38
End: 2023-02-20

## 2023-02-20 DIAGNOSIS — M54.16 LUMBAR RADICULOPATHY: Primary | ICD-10-CM

## 2023-02-22 NOTE — PROGRESS NOTES
PT Re-Evaluation     Today's date: 2023  Patient name: Carmen Gomez  : 1985  MRN: [id number]  Referring provider: Tim Zheng PT  Dx:   Encounter Diagnosis     ICD-10-CM    1  Lumbar radiculopathy  M54 16                      Assessment  Assessment details: PT notes the patient with decrease ROM and strength t/o the lumbar spine with trunk/core weakness leading to increase pain levels and functional limitations with need for continuation of skilled therapy for 2 weeks with focus on lumbar stabilization, manual therapy, posture, analgesic modalities, and transition to HEP  Impairments: abnormal or restricted ROM, activity intolerance, impaired physical strength, lacks appropriate home exercise program and pain with function  Understanding of Dx/Px/POC: excellent  Goals  ST  Initiate HEP-MET  2  Decrease pain levels by 25-50%-Progressing    3  Increase ROM to WNL t/o the lumbar spine-MET    4  Increase strength by 1-2 mm grades-MET  5  No LLD with normal pelvis-MET   LT  Improve spinal stability with increase trunk/core strength-Progressing    2  Decrease limitations with standing, walking and stairs-Progressing   3  Decrease limitations with trunk movement, lifting and carrying-Progressing   4  Decrease limitations with transfers and ADL-MET  5   DC with HEP-Progressing     Plan  Plan details: Transition to HEP      Patient would benefit from: skilled physical therapy  Planned modality interventions: thermotherapy: hydrocollator packs  Planned therapy interventions: manual therapy, neuromuscular re-education, patient education, self care, strengthening, stretching, therapeutic exercise, home exercise program and graded exercise  Frequency: 2x week  Duration in weeks: 2  Treatment plan discussed with: patient        Subjective Evaluation    History of Present Illness  Mechanism of injury: Patient reports development of LBP about 2 5 months after trip down 2 steps with twisting motion  Patient reports over time the symptoms have stayed about the same but continuation of limitations with trunk movement, push/pull, sleep, transfers, lifting, carrying and ADL  Patient contacted OPPT about setting up DA evaluation  Patient reports she trailed several sessions of chriopractic treatment with no change in status  Patient denies any denies any LE symptoms  Patient reports she is employed [employer] as  with significant PMH of GERD  Presently the patient has attended 7 sessions of skilled therapy and feels slight improvement since the start of therapy  Patient reports the back and LE flexibility have improved but continuation of increase pain levels with limitations with trunk movement, ADL, lifting, carrying, and sleep with need for continuation of skilled therapy  Pain  Current pain ratin  At best pain ratin  At worst pain ratin  Location: Right Lumbar Spine   Quality: pulling, sharp and tight  Relieving factors: change in position  Aggravating factors: lifting, stair climbing, walking and standing  Progression: improved    Treatments  Current treatment: physical therapy  Patient Goals  Patient goals for therapy: decreased pain, increased motion, increased strength, independence with ADLs/IADLs and return to sport/leisure activities          Objective     Postural Observations  Seated posture: fair  Standing posture: fair        Tenderness     Lumbar Spine  No tenderness in the spinous process  Left Hip   No tenderness in the PSIS, greater trochanter and sacroiliac joint  Right Hip   No tenderness in the PSIS, greater trochanter and sacroiliac joint       Neurological Testing     Reflexes   Left   Patellar (L4): trace (1+)  Achilles (S1): trace (1+)    Right   Patellar (L4): trace (1+)  Achilles (S1): trace (1+)    Active Range of Motion     Lumbar   Flexion:  WFL  Extension: 17 degrees  with pain  Left lateral flexion:  WFL  Right lateral flexion:  WFL and with pain  Left rotation:  WFL  Right rotation:  [facility] and with pain  Left Hip   Normal active range of motion    Right Hip   Normal active range of motion  Left Knee   Normal active range of motion    Right Knee   Normal active range of motion    Additional Active Range of Motion Details  PT notes continuation of decrease lumbar extension with trunk/core weakness with abdominals of 4/5 and lumbar paraspinals of 4/5     Joint Play     Hypomobile: L4, L5 and S1     Pain: L4, L5 and S1     Strength/Myotome Testing     Left Hip   Normal muscle strength    Right Hip   Planes of Motion   Flexion: 5  Extension: 5  Abduction: 5  Adduction: 5  External rotation: 4+  Internal rotation: 4+    Left Knee   Normal strength    Right Knee   Normal strength    Tests     Left Hip   Negative ENRIKE, FADIR and long sit  Chuck: Negative  90/90 SLR: Negative  SLR: Negative  Right Hip   Negative ENRIKE, FADIR and long sit  Chuck: Negative  90/90 SLR: Negative  SLR: Negative  Additional Tests Details  PT notes normal pelvis with no LLD              Precautions:  None       Manuals 2/9 2/13 2/16 2/20 2/23   Right Innominate Posterior correction  NT 5 min right Anterior correction   5'BM 5 min Right SI mobs    Lumbar PA mobs with bilateral piriformis and hip flexor stretch  10' 5 min  10 min 5'MZ 5 min    STM to the right lower lumbar paraspinals  5' 5 min with Bilat Lumbar Rolls  5 min 5'BM 5 min           Neuro Re-Ed    2/20    Bridge and with ABD  2x10 GTB 2x10 Green  2x10 Green  2x10 GTB 2x10 Green    Supine Tball ABD crunch      Seated Tball Trunk Rotation and PNF   10x Each    BOSU Bridge      Side Plank   5x5"    BOSU march         BOSU SLR  3 way         BOSU Squat         PPU with exhale  10x3" exhale 10x3" Exhale  10x3" Exhale  NT NT   Ther Ex    2/20    Treadmill  10' 1 5-2 5 mph 10 min   1 5-2 5 MPH  10 min   1 5-2 5 MPH  10' 1 5-  2  5MPH 10 min   1 5-2 5 MPH    Prone OAL  10x B/L 10x Bilat  NT 10x bilat 10x Bilat    S/L Clam shells  2x10 b/l green 2x10 Bilat   Green  2x10 Bilat   Green  [id number] GTB 2x10 Bilat   Green    Supine Tball LTR 10x5" hold 10x5" Hold Bilat  10x5" Hold Bilat  10x5" bilat DC   S/L [facility] with PT OP   3x15" Hold Bilat  Scissor foam ball 10x :03 bilat Scissor foam ball 10x3"bilat 10x3" Hold Bilat    Leg press DL   75# 2x10 75# 2x10 NT           HEP update/review         Ther Activity    2/20                    Gait Training    2/20                    Modalities    2/20    MHP   15 min in seated   15' in seated Declined

## 2023-02-23 ENCOUNTER — EVALUATION (OUTPATIENT)
Dept: PHYSICAL THERAPY | Facility: CLINIC | Age: 38
End: 2023-02-23

## 2023-02-23 DIAGNOSIS — M54.16 LUMBAR RADICULOPATHY: Primary | ICD-10-CM

## 2023-02-24 NOTE — PROGRESS NOTES
Daily Note     Today's date: 2023  Patient name: Darryl Short  : 1985  MRN: [id number]  Referring provider: Shawn Cowart , PT  Dx:   Encounter Diagnosis     ICD-10-CM    1  Lumbar radiculopathy  M54 16                      Subjective:  Patient reports she was cleaning all weekend contributing to increase LBP but overall no soreness today in the LB or hips  Objective: See treatment diary below      Assessment: Tolerated treatment well  PT notes continuation of decrease ROM and strength t/o the lumbar spine with trunk/core weakness with need for continuation of skilled therapy  Plan: Continue per plan of care  Precautions:  None       Manuals    Right Innominate Posterior correction  5 min right Anterior correction   5'BM 5 min Right SI mobs  5 min Right SI Mobs    Lumbar PA mobs with bilateral piriformis and hip flexor stretch  5 min  10 min 5'MZ 5 min  5 min    STM to the right lower lumbar paraspinals  5 min with Bilat Lumbar Rolls  5 min 5'BM 5 min 5 min            Neuro Re-Ed        Bridge and with ABD  2x10 Green  2x10 Green  2x10 GTB 2x10 Green  2x10 Green    Supine Tball ABD crunch     Seated Tball Trunk Rotation and PNF   10x Each  15x Each   Bilat    BOSU Bridge     Side Plank   5x5"  10x5" Hold Bilat    BOSU march      Seated LAQ and Hip March   10x Each Bilat    BOSU SLR  3 way         BOSU Squat         PPU with exhale  10x3" Exhale  10x3" Exhale  NT NT DC   Ther Ex        Treadmill  10 min   1 5-2 5 MPH  10 min   1 5-2 5 MPH  10' 1 5-  2  5MPH 10 min   1 5-2 5 MPH  10 min   1 5-2 5 MPH    Prone OAL  10x Bilat  NT 10x bilat 10x Bilat  10x Bilat    S/L Clam shells  2x10 Bilat   Green  2x10 Bilat   Green  [id number] GTB 2x10 Bilat   Green  2x10 Bilat Green    Supine Tball LTR 10x5" Hold Bilat  10x5" Hold Bilat  10x5" bilat DC    S/L [facility] with PT OP  3x15" Hold Bilat  Scissor foam ball 10x :03 bilat Scissor foam ball 10x3"bilat 10x3" Hold Bilat  10x3" Hold Bilat    Leg press DL  75# 2x10 75# 2x10 NT 75# 2x10            HEP update/review         Ther Activity   2/20                     Gait Training   2/20                     Modalities   2/20     MHP  15 min in seated   15' in seated Declined  Declined

## 2023-02-27 ENCOUNTER — OFFICE VISIT (OUTPATIENT)
Dept: PHYSICAL THERAPY | Facility: CLINIC | Age: 38
End: 2023-02-27

## 2023-02-27 DIAGNOSIS — F32.89 OTHER DEPRESSION: ICD-10-CM

## 2023-02-27 DIAGNOSIS — M54.16 LUMBAR RADICULOPATHY: Primary | ICD-10-CM

## 2023-02-27 RX ORDER — DULOXETIN HYDROCHLORIDE 60 MG/1
60 CAPSULE, DELAYED RELEASE ORAL DAILY
Qty: 90 CAPSULE | Refills: 0 | Status: SHIPPED | OUTPATIENT
Start: 2023-02-27

## 2023-03-01 NOTE — PROGRESS NOTES
Daily Note     Today's date: 3/2/2023  Patient name: Jessa Shipman  : 1985  MRN: [id number]  Referring provider: Ennis Kanner , PT  Dx:   Encounter Diagnosis     ICD-10-CM    1  Lumbar radiculopathy  M54 16                      Subjective:  Patient reports her flexibility continues to improve but continuation of occasional flare ups in the LB  Objective: See treatment diary below      Assessment: Tolerated treatment well  PT notes patient is approaching therapy goals so PT will plan on transition to [facility] next week  Plan: Continue per plan of care  Precautions:  None       Manuals 2/16 2/20 2/23 2/27 3/2   Right Innominate Posterior correction   5'BM 5 min Right SI mobs  5 min Right SI Mobs  5 min Right SI Mobs    Lumbar PA mobs with bilateral piriformis and hip flexor stretch  10 min 5'MZ 5 min  5 min  5 min    STM to the right lower lumbar paraspinals  5 min 5'BM 5 min 5 min  5 min            Neuro Re-Ed        Bridge and with ABD  2x10 Green  2x10 GTB 2x10 Green  2x10 Green  2x10 Green    Supine Tball ABD crunch    Seated Tball Trunk Rotation and PNF   10x Each  15x Each   Bilat  15x Each   Bilat    BOSU Bridge    Side Plank   5x5"  10x5" Hold Bilat  10x5" Hold and Prone Plank    BOSU march     Seated LAQ and Hip March   10x Each Bilat  15x Each Bilat    BOSU SLR  3 way         BOSU Squat         PPU with exhale  10x3" Exhale  NT NT DC    Ther Ex        Treadmill  10 min   1 5-2 5 MPH  10' 1 5-  2  5MPH 10 min   1 5-2 5 MPH  10 min   1 5-2 5 MPH  10 min   2 0-3 0 MPH    Prone OAL  NT 10x bilat 10x Bilat  10x Bilat  10x Bilat    S/L Clam shells  2x10 Bilat   Green  [id number] GTB 2x10 Bilat   Green  2x10 Bilat Green  2x10 Bilat   Green    Supine Tball LTR 10x5" Hold Bilat  10x5" bilat DC     Scissor Ball Squeeze  Scissor foam ball 10x :03 bilat Scissor foam ball 10x3"bilat 10x3" Hold Bilat  10x3" Hold Bilat  10x3" Hold Bilat    Leg press DL 75# 2x10 75# 2x10 NT 75# 2x10 85# DL  65# SL  2x10 Each            HEP update/review         Ther Activity  2/20                      Gait Training  2/20                      Modalities  2/20      MHP   15' in seated Declined  Declined  15 min

## 2023-03-02 ENCOUNTER — OFFICE VISIT (OUTPATIENT)
Dept: PHYSICAL THERAPY | Facility: CLINIC | Age: 38
End: 2023-03-02

## 2023-03-02 DIAGNOSIS — M54.16 LUMBAR RADICULOPATHY: Primary | ICD-10-CM

## 2023-03-03 NOTE — PROGRESS NOTES
Daily Note     Today's date: 3/3/2023  Patient name: Ilda Singh  : 1985  MRN: [id number]  Referring provider: Ene Chavarria , PT  Dx:   Encounter Diagnosis     ICD-10-CM    1  Lumbar radiculopathy  M54 16                      Subjective: ***      Objective: See treatment diary below      Assessment: Tolerated treatment {Tolerated treatment :[id number]}  PT notes the patient is approaching therapy goals so PT will plan on transition to HEP next session  Plan: Potential discharge next visit  Precautions:  None       Manuals 2/20 2/23 2/27 3/2 3   Right Innominate Posterior correction  5'BM 5 min Right SI mobs  5 min Right SI Mobs  5 min Right SI Mobs     Lumbar PA mobs with bilateral piriformis and hip flexor stretch  5'MZ 5 min  5 min  5 min     STM to the right lower lumbar paraspinals  5'BM 5 min 5 min  5 min             Neuro Re-Ed        Bridge and with ABD  2x10 GTB 2x10 Green  2x10 Green  2x10 Green     Supine Tball ABD crunch   Seated Tball Trunk Rotation and PNF   10x Each  15x Each   Bilat  15x Each   Bilat     BOSU Bridge   Side Plank   5x5"  10x5" Hold Bilat  10x5" Hold and Prone Plank     BOSU march    Seated LAQ and Hip March   10x Each Bilat  15x Each Bilat     BOSU SLR  3 way         BOSU Squat         PPU with exhale  NT NT DC     Ther Ex        Treadmill  10' 1 5-  2  5MPH 10 min   1 5-2 5 MPH  10 min   1 5-2 5 MPH  10 min   2 0-3 0 MPH     Prone OAL  10x bilat 10x Bilat  10x Bilat  10x Bilat     S/L Clam shells  [id number] GTB 2x10 Bilat   Green  2x10 Bilat Green  2x10 Bilat   Green     Supine Tball LTR 10x5" bilat DC      Scissor Ball Squeeze  Scissor foam ball 10x3"bilat 10x3" Hold Bilat  10x3" Hold Bilat  10x3" Hold Bilat     Leg press DL 75# 2x10 NT 75# 2x10  85# DL  65# SL  2x10 Each             HEP update/review         Ther Activity                        Gait Training                        Modalities        MHP  15' in seated Declined Declined  15 min

## 2023-03-06 ENCOUNTER — OFFICE VISIT (OUTPATIENT)
Dept: PHYSICAL THERAPY | Facility: CLINIC | Age: 38
End: 2023-03-06

## 2023-03-06 DIAGNOSIS — M54.16 LUMBAR RADICULOPATHY: Primary | ICD-10-CM

## 2023-03-06 NOTE — PROGRESS NOTES
Daily Note     Today's date: 3/6/2023  Patient name: Ilda Singh  : 1985  MRN: [id number]  Referring provider: Ene Chavarria , PT  Dx:   Encounter Diagnosis     ICD-10-CM    1  Lumbar radiculopathy  M54 16           Start Time: 1500  Stop Time: 1600  Total time in clinic (min): 60 minutes    Subjective: Patient reports feeling ok  Patient with no new complaints  Objective: See treatment diary below      Assessment: Tolerated treatment well  Patient participated in skilled PT session focused on strengthening, stretching and ROM  Patient progressing well with program with no symptoms  Patient would continue to benefit from skilled PT interventions to address strengthening, stretching, and ROM  Patient demonstrated fatigue post treatment and exhibited good technique with therapeutic exercises      Plan: Continue per plan of care  Precautions:  None       Manuals 2/16 2/20 2/23 2/27 3/2 3/6   Right Innominate Posterior correction   5'BM 5 min Right SI mobs  5 min Right SI Mobs  5 min Right SI Mobs     Lumbar PA mobs with bilateral piriformis and hip flexor stretch  10 min 5'MZ 5 min  5 min  5 min     STM to the right lower lumbar paraspinals  5 min 5'BM 5 min 5 min  5 min  10 min            Neuro Re-Ed  2/20    3/6   Bridge and with ABD  2x10 Green  2x10 GTB 2x10 Green  2x10 Green  2x10 Green  2x10   Green   Supine Tball ABD crunch    Seated Tball Trunk Rotation and PNF   10x Each  15x Each   Bilat  15x Each   Bilat  Seated Tball Trunk Rotation and PNF x 15 ea   BOSU Bridge    Side Plank   5x5"  10x5" Hold Bilat  10x5" Hold and Prone Plank  10x5" hold and Prone Plank ea   BOSU march     Seated LAQ and Hip March   10x Each Bilat  15x Each Bilat  Seated on Pball LAQ and Hip march 10x ea B/L   BOSU SLR  3 way          BOSU Squat          PPU with exhale  10x3" Exhale  NT NT DC     Ther Ex  2/20    3/6   Treadmill  10 min   1 5-2 5 MPH  10' 1 5-  2  5MPH 10 min   1 5-2 5 MPH  10 min 1 5-2 5 MPH  10 min   2 0-3 0 MPH  10 min  2 5 - 3 0 mph   Prone OAL  NT 10x bilat 10x Bilat  10x Bilat  10x Bilat     S/L Clam shells  2x10 Bilat   Green  [id number] GTB 2x10 Bilat   Green  2x10 Bilat Green  2x10 Bilat   Green  2x10 B/L  Green   Supine Tball LTR 10x5" Hold Bilat  10x5" bilat DC      Scissor Ball Squeeze  Scissor foam ball 10x :03 bilat Scissor foam ball 10x3"bilat 10x3" Hold Bilat  10x3" Hold Bilat  10x3" Hold Bilat  10x3" Hold  B/L   Leg press DL 75# 2x10 75# 2x10 NT 75# 2x10  85# DL  65# SL  2x10 Each  85# DL  65# SL  2x10 ea            HEP update/review          Ther Activity  2/20                         Gait Training  2/20                         Modalities  2/20       MHP   15' in seated Declined  Declined  15 min  15 min

## 2023-03-09 ENCOUNTER — OFFICE VISIT (OUTPATIENT)
Dept: PHYSICAL THERAPY | Facility: CLINIC | Age: 38
End: 2023-03-09

## 2023-03-09 DIAGNOSIS — M54.16 LUMBAR RADICULOPATHY: Primary | ICD-10-CM

## 2023-03-09 NOTE — PROGRESS NOTES
PT Discharge    Today's date: 3/9/2023  Patient name: Gomez Hughes  : 1985  MRN: [id number]  Referring provider: Segundo East PT  Dx:   Encounter Diagnosis     ICD-10-CM    1  Lumbar radiculopathy  M54 16           Start Time: 1500  Stop Time: 1600  Total time in clinic (min): 60 minutes    Assessment  Assessment details: PT notes the patient with increased ROM and strength t/o the lumbar spine with trunk/core weakness leading to decrease pain levels and improvement in functional independence and will now transition to HEP in order to maintain functional gains achieved in PT  Impairments: abnormal or restricted ROM, activity intolerance, impaired physical strength, lacks appropriate home exercise program and pain with function  Understanding of Dx/Px/POC: excellent  Goals  ST  Initiate HEP-MET  2  Decrease pain levels by 25-50%-Progressing    3  Increase ROM to WNL t/o the lumbar spine-MET    4  Increase strength by 1-2 mm grades-MET  5  No LLD with normal pelvis-MET   LT  Improve spinal stability with increase trunk/core strength-MET    2  Decrease limitations with standing, walking and stairs-MET  3  Decrease limitations with trunk movement, lifting and carrying-MET  4  Decrease limitations with transfers and ADL-MET  5   DC with HEP-MET      Plan  Plan details: Transition to HEP  Patient would benefit from: skilled physical therapy  Planned modality interventions: thermotherapy: hydrocollator packs  Planned therapy interventions: manual therapy, neuromuscular re-education, patient education, self care, strengthening, stretching, therapeutic exercise, home exercise program and graded exercise  Frequency: 2x week  Duration in weeks: 2  Treatment plan discussed with: patient        Subjective Evaluation    History of Present Illness  Mechanism of injury: Patient reports development of LBP about 2 5 months after trip down 2 steps with twisting motion    Patient reports over time the symptoms have stayed about the same but continuation of limitations with trunk movement, push/pull, sleep, transfers, lifting, carrying and ADL  Patient contacted OPPT about setting up DA evaluation  Patient reports she trailed several sessions of chriopractic treatment with no change in status  Patient denies any denies any LE symptoms  Patient reports she is employed FT/FD as  with significant PMH of GERD  Presently the patient has attended 7 sessions of skilled therapy and feels slight improvement since the start of therapy  Patient reports the back and LE flexibility have improved but continuation of increase pain levels with limitations with trunk movement, ADL, lifting, carrying, and sleep with need for continuation of skilled therapy  Pain  Current pain ratin  At best pain ratin  At worst pain ratin  Location: Right Lumbar Spine   Quality: pulling, sharp and tight  Relieving factors: change in position  Aggravating factors: lifting, stair climbing, walking and standing  Progression: improved    Treatments  Current treatment: physical therapy  Patient Goals  Patient goals for therapy: decreased pain, increased motion, increased strength, independence with ADLs/IADLs and return to sport/leisure activities          Objective     Postural Observations  Seated posture: fair  Standing posture: fair        Tenderness     Lumbar Spine  No tenderness in the spinous process  Left Hip   No tenderness in the PSIS, greater trochanter and sacroiliac joint  Right Hip   No tenderness in the PSIS, greater trochanter and sacroiliac joint       Neurological Testing     Reflexes   Left   Patellar (L4): trace (1+)  Achilles (S1): trace (1+)    Right   Patellar (L4): trace (1+)  Achilles (S1): trace (1+)    Active Range of Motion     Lumbar   Flexion:  WFL  Extension: 17 degrees  with pain  Left lateral flexion:  WFL  Right lateral flexion:  WFL and with pain  Left rotation:  WFL  [facility] and with pain  Left Hip   Normal active range of motion    Right Hip   Normal active range of motion  Left Knee   Normal active range of motion    Right Knee   Normal active range of motion    Additional Active Range of Motion Details  PT notes continuation of decrease lumbar extension with trunk/core weakness with abdominals of 4/5 and lumbar paraspinals of 4/5     Joint Play     Hypomobile: L4, L5 and S1     Pain: L4, L5 and S1     Strength/Myotome Testing     Left Hip   Normal muscle strength    Right Hip   Planes of Motion   Flexion: 5  Extension: 5  Abduction: 5  Adduction: 5  External rotation: 4+  Internal rotation: 4+    Left Knee   Normal strength    Right Knee   Normal strength    Tests     Left Hip   Negative ENRIKE, FADIR and long sit  Chuck: Negative  90/90 SLR: Negative  SLR: Negative  Right Hip   Negative ENRIKE, FADIR and long sit  Chuck: Negative  90/90 SLR: Negative  SLR: Negative       Additional Tests Details  PT notes normal pelvis with no LLD              Precautions:  None     Manuals 3/9 2/20 2/23 2/27 3/2 3/6   Right Innominate Posterior correction    5'BM 5 min Right SI mobs  5 min Right SI Mobs  5 min Right SI Mobs      Lumbar PA mobs with bilateral piriformis and hip flexor stretch  10 min 5'MZ 5 min  5 min  5 min      STM to the right lower lumbar paraspinals  5 min 5'BM 5 min 5 min  5 min  10 min                   Neuro Re-Ed  3/9 2/20       3/6   Bridge and with ABD  2x10 Green  2x10 GTB 2x10 Green  2x10 Green  2x10 Green  2x10   Green   Supine Tball ABD crunch   Seated Tball trunk rotation and PNFx15 ea   Seated Tball Trunk Rotation and PNF   10x Each  15x Each   Bilat  15x Each   Bilat  Seated Tball Trunk Rotation and PNF x 15 ea   BOSU Bridge   10x5" hold  and Prone Plank ea   Side Plank   5x5"  10x5" Hold Bilat  10x5" Hold and Prone Plank  10x5" hold and Prone Plank ea   BOSU march   Seated LAQ and hip march 10x ea B/L     Seated LAQ and Hip March   10x Each Bilat  15x Each Bilat  Seated on Pball LAQ and Hip march 10x ea B/L   BOSU SLR  3 way                BOSU Squat                PPU with exhale   NT NT DC       Ther Ex  3/9 2/20       3/6   Treadmill  10 min   1 5-2 5 MPH  10' 1 5-  2  5MPH 10 min   1 5-2 5 MPH  10 min   1 5-2 5 MPH  10 min   2 0-3 0 MPH  10 min  2 5 - 3 0 mph   Prone OAL  10x B/L  10x bilat 10x Bilat  10x Bilat  10x Bilat      S/L Clam shells  2x10 Bilat   Green  [id number] GTB 2x10 Bilat   Green  2x10 Bilat Green  2x10 Bilat   Green  2x10 B/L  Green   Supine Tball LTR 10x5" Hold Bilat  10x5" bilat DC         Scissor Ball Squeeze  Scissor foam ball 10x :03 bilat Scissor foam ball 10x3"bilat 10x3" Hold Bilat  10x3" Hold Bilat  10x3" Hold Bilat  10x3" Hold  B/L   Leg press DL 85# DL  65# SL  2x10 75# 2x10 NT 75# 2x10  85# DL  65# SL  2x10 Each  85# DL  65# SL  2x10 ea                   HEP update/review                Ther Activity   2/20                                           Gait Training   2/20                                           Modalities   2/20           MHP    15' in seated Declined  Declined  15 min  15 min

## 2023-05-24 DIAGNOSIS — F32.89 OTHER DEPRESSION: ICD-10-CM

## 2023-05-24 RX ORDER — DULOXETIN HYDROCHLORIDE 60 MG/1
60 CAPSULE, DELAYED RELEASE ORAL DAILY
Qty: 90 CAPSULE | Refills: 0 | Status: SHIPPED | OUTPATIENT
Start: 2023-05-24

## 2023-06-07 ENCOUNTER — OFFICE VISIT (OUTPATIENT)
Dept: FAMILY MEDICINE CLINIC | Facility: CLINIC | Age: 38
End: 2023-06-07
Payer: COMMERCIAL

## 2023-06-07 VITALS
SYSTOLIC BLOOD PRESSURE: 114 MMHG | BODY MASS INDEX: 19.55 KG/M2 | HEIGHT: 69 IN | TEMPERATURE: 98.2 F | WEIGHT: 132 LBS | OXYGEN SATURATION: 99 % | DIASTOLIC BLOOD PRESSURE: 60 MMHG | HEART RATE: 95 BPM

## 2023-06-07 DIAGNOSIS — R23.3 EASY BRUISING: ICD-10-CM

## 2023-06-07 DIAGNOSIS — K21.9 GASTROESOPHAGEAL REFLUX DISEASE WITHOUT ESOPHAGITIS: ICD-10-CM

## 2023-06-07 DIAGNOSIS — F41.9 ANXIETY: ICD-10-CM

## 2023-06-07 DIAGNOSIS — Z00.00 ANNUAL PHYSICAL EXAM: Primary | ICD-10-CM

## 2023-06-07 DIAGNOSIS — Z00.8 OTHER SPECIFIED GENERAL MEDICAL EXAMINATION: ICD-10-CM

## 2023-06-07 DIAGNOSIS — F33.0 MILD EPISODE OF RECURRENT MAJOR DEPRESSIVE DISORDER (HCC): ICD-10-CM

## 2023-06-07 PROBLEM — M75.01 ADHESIVE CAPSULITIS OF RIGHT SHOULDER: Status: RESOLVED | Noted: 2022-12-05 | Resolved: 2023-06-07

## 2023-06-07 PROBLEM — M25.511 ACUTE PAIN OF RIGHT SHOULDER: Status: RESOLVED | Noted: 2022-12-05 | Resolved: 2023-06-07

## 2023-06-07 PROCEDURE — 99395 PREV VISIT EST AGE 18-39: CPT | Performed by: NURSE PRACTITIONER

## 2023-06-07 NOTE — PROGRESS NOTES
ADULT ANNUAL PHYSICAL  [address] [facility] PRIMARY CARE    NAME: Cruzito Gotti  AGE: 44 y o  SEX: female  : 1985     DATE: 2023     Assessment and Plan:     Problem List Items Addressed This Visit        Digestive    GERD (gastroesophageal reflux disease)       Other    Anxiety    Depression   Other Visit Diagnoses     Annual physical exam    -  Primary    Relevant Orders    Comprehensive metabolic panel    Other specified general medical examination        Relevant Orders    Lipid panel    HEMOGLOBIN A1C W/ EAG ESTIMATION          Immunizations and preventive care screenings were discussed with patient today  Appropriate education was printed on patient's after visit summary  Counseling:  · Dental Health: discussed importance of regular tooth brushing, flossing, and dental visits  No follow-ups on file  Chief Complaint:     Chief Complaint   Patient presents with   • Physical Exam      History of Present Illness:     Adult Annual Physical   Patient here for a comprehensive physical exam  The patient reports she bruises easily - has not been worked up for this in the past - asking for any testing that could help find the reason why  Diet and Physical Activity  · Diet/Nutrition: well balanced diet  · Exercise: walking  Depression Screening  PHQ-2/9 Depression Screening         General Health  · Sleep: sleeps well  · Hearing: normal - bilateral   · Vision: no vision problems  · Dental: regular dental visits  /GYN Health  · Last menstrual period: n/a  · Contraceptive method: IUD placement  · History of STDs?: no      Review of Systems:     Review of Systems   Constitutional: Negative  HENT: Negative  Respiratory: Negative  Cardiovascular: Negative  Gastrointestinal: Negative  Neurological: Negative  Hematological: Bruises/bleeds easily  All other systems reviewed and are negative       Past Medical History:     Past Medical History:   Diagnosis Date   • Anxiety 2016   • Depression 2016   • GERD (gastroesophageal reflux disease) 2015      Past Surgical History:     History reviewed  No pertinent surgical history  Social History:     Social History     Socioeconomic History   • Marital status: Single     Spouse name: None   • Number of children: None   • Years of education: None   • Highest education level: None   Occupational History   • None   Tobacco Use   • Smoking status: Never   • Smokeless tobacco: Never   Vaping Use   • Vaping Use: Never used   Substance and Sexual Activity   • Alcohol use: Yes     Comment: socially   • Drug use: Never   • Sexual activity: Yes     Partners: Male     Birth control/protection: I U D  Other Topics Concern   • None   Social History Narrative   • None     Social Determinants of Health     Financial Resource Strain: Not on file   Food Insecurity: Not on file   Transportation Needs: Not on file   Physical Activity: Not on file   Stress: Not on file   Social Connections: Not on file   Intimate Partner Violence: Not on file   Housing Stability: Not on file      Family History:     Family History   Problem Relation Age of Onset   • Hypertension Maternal Grandmother    • Diabetes Maternal Grandmother    • Thyroid disease Maternal Grandmother    • Breast cancer Maternal Grandmother    • Hypertension Maternal Grandfather    • Asthma Maternal Grandfather    • Cancer Maternal Grandfather    • Glaucoma Maternal Grandfather    • Thyroid disease Mother    • Asthma Sister       Current Medications:     Current Outpatient Medications   Medication Sig Dispense Refill   • DULoxetine (CYMBALTA) 60 mg delayed release capsule Take 1 capsule (60 mg total) by mouth daily 90 capsule 0   • levonorgestrel (MIRENA) 20 MCG/24HR IUD 1 each by Intrauterine route       No current facility-administered medications for this visit        Allergies:     No Known Allergies   Physical Exam:     /60 (BP "Location: Left arm, Patient Position: Sitting, Cuff Size: Standard)   Pulse 95   Temp 98 2 °F (36 8 °C)   Ht 5' 9\" (1 753 m)   Wt 59 9 kg (132 lb)   SpO2 99%   BMI 19 49 kg/m²     Physical Exam  Vitals and nursing note reviewed  Constitutional:       General: She is not in acute distress  Appearance: Normal appearance  She is well-developed  HENT:      Head: Normocephalic and atraumatic  Eyes:      Conjunctiva/sclera: Conjunctivae normal    Cardiovascular:      Rate and Rhythm: Normal rate and regular rhythm  Heart sounds: No murmur heard  No gallop  Pulmonary:      Effort: Pulmonary effort is normal  No respiratory distress  Breath sounds: Normal breath sounds  Abdominal:      Palpations: Abdomen is soft  Tenderness: There is no abdominal tenderness  Musculoskeletal:      Cervical back: Neck supple  Skin:     General: Skin is warm and dry  Neurological:      General: No focal deficit present  Mental Status: She is alert and oriented to person, place, and time  Mental status is at baseline  Psychiatric:         Mood and Affect: Mood normal          Behavior: Behavior normal          Thought Content:  Thought content normal          Judgment: Judgment normal           SALTY Ryan   [facility] PRIMARY [facility]  "

## 2023-06-14 ENCOUNTER — APPOINTMENT (OUTPATIENT)
Dept: LAB | Facility: HOSPITAL | Age: 38
End: 2023-06-14
Payer: COMMERCIAL

## 2023-06-14 DIAGNOSIS — R23.3 EASY BRUISING: ICD-10-CM

## 2023-06-14 DIAGNOSIS — Z00.8 OTHER SPECIFIED GENERAL MEDICAL EXAMINATION: ICD-10-CM

## 2023-06-14 DIAGNOSIS — Z00.00 ANNUAL PHYSICAL EXAM: ICD-10-CM

## 2023-06-14 LAB
25(OH)D3 SERPL-MCNC: 67.5 NG/ML (ref 30–100)
ALBUMIN SERPL BCP-MCNC: 4.3 G/DL (ref 3.5–5)
ALP SERPL-CCNC: 63 U/L (ref 34–104)
ALT SERPL W P-5'-P-CCNC: 28 U/L (ref 7–52)
ANION GAP SERPL CALCULATED.3IONS-SCNC: 5 MMOL/L (ref 4–13)
AST SERPL W P-5'-P-CCNC: 23 U/L (ref 13–39)
BASOPHILS # BLD AUTO: 0.05 THOUSANDS/ÂΜL (ref 0–0.1)
BASOPHILS NFR BLD AUTO: 1 % (ref 0–1)
BILIRUB SERPL-MCNC: 0.82 MG/DL (ref 0.2–1)
BUN SERPL-MCNC: 18 MG/DL (ref 5–25)
CALCIUM SERPL-MCNC: 9.4 MG/DL (ref 8.4–10.2)
CHLORIDE SERPL-SCNC: 103 MMOL/L (ref 96–108)
CHOLEST SERPL-MCNC: 120 MG/DL
CO2 SERPL-SCNC: 30 MMOL/L (ref 21–32)
CREAT SERPL-MCNC: 0.76 MG/DL (ref 0.6–1.3)
EOSINOPHIL # BLD AUTO: 0.15 THOUSAND/ÂΜL (ref 0–0.61)
EOSINOPHIL NFR BLD AUTO: 2 % (ref 0–6)
ERYTHROCYTE [DISTWIDTH] IN BLOOD BY AUTOMATED COUNT: 11.9 % (ref 11.6–15.1)
FERRITIN SERPL-MCNC: 89 NG/ML (ref 11–307)
GFR SERPL CREATININE-BSD FRML MDRD: 99 ML/MIN/1.73SQ M
GLUCOSE P FAST SERPL-MCNC: 92 MG/DL (ref 65–99)
HCT VFR BLD AUTO: 41.8 % (ref 34.8–46.1)
HDLC SERPL-MCNC: 38 MG/DL
HGB BLD-MCNC: 14 G/DL (ref 11.5–15.4)
IMM GRANULOCYTES # BLD AUTO: 0.03 THOUSAND/UL (ref 0–0.2)
IMM GRANULOCYTES NFR BLD AUTO: 0 % (ref 0–2)
IRON SATN MFR SERPL: 30 % (ref 15–50)
IRON SERPL-MCNC: 82 UG/DL (ref 50–170)
LDLC SERPL CALC-MCNC: 51 MG/DL (ref 0–100)
LYMPHOCYTES # BLD AUTO: 2.17 THOUSANDS/ÂΜL (ref 0.6–4.47)
LYMPHOCYTES NFR BLD AUTO: 29 % (ref 14–44)
MCH RBC QN AUTO: 34.5 PG (ref 26.8–34.3)
MCHC RBC AUTO-ENTMCNC: 33.5 G/DL (ref 31.4–37.4)
MCV RBC AUTO: 103 FL (ref 82–98)
MONOCYTES # BLD AUTO: 0.86 THOUSAND/ÂΜL (ref 0.17–1.22)
MONOCYTES NFR BLD AUTO: 12 % (ref 4–12)
NEUTROPHILS # BLD AUTO: 4.15 THOUSANDS/ÂΜL (ref 1.85–7.62)
NEUTS SEG NFR BLD AUTO: 56 % (ref 43–75)
NONHDLC SERPL-MCNC: 82 MG/DL
NRBC BLD AUTO-RTO: 0 /100 WBCS
PLATELET # BLD AUTO: 280 THOUSANDS/UL (ref 149–390)
PMV BLD AUTO: 9.5 FL (ref 8.9–12.7)
POTASSIUM SERPL-SCNC: 4.3 MMOL/L (ref 3.5–5.3)
PROT SERPL-MCNC: 7 G/DL (ref 6.4–8.4)
RBC # BLD AUTO: 4.06 MILLION/UL (ref 3.81–5.12)
SODIUM SERPL-SCNC: 138 MMOL/L (ref 135–147)
TIBC SERPL-MCNC: 270 UG/DL (ref 250–450)
TRIGL SERPL-MCNC: 155 MG/DL
WBC # BLD AUTO: 7.41 THOUSAND/UL (ref 4.31–10.16)

## 2023-06-14 PROCEDURE — 36415 COLL VENOUS BLD VENIPUNCTURE: CPT

## 2023-06-14 PROCEDURE — 82306 VITAMIN D 25 HYDROXY: CPT

## 2023-06-14 PROCEDURE — 85025 COMPLETE CBC W/AUTO DIFF WBC: CPT

## 2023-06-14 PROCEDURE — 83036 HEMOGLOBIN GLYCOSYLATED A1C: CPT

## 2023-06-14 PROCEDURE — 80061 LIPID PANEL: CPT

## 2023-06-14 PROCEDURE — 83550 IRON BINDING TEST: CPT

## 2023-06-14 PROCEDURE — 83540 ASSAY OF IRON: CPT

## 2023-06-14 PROCEDURE — 82728 ASSAY OF FERRITIN: CPT

## 2023-06-14 PROCEDURE — 80053 COMPREHEN METABOLIC PANEL: CPT

## 2023-06-15 LAB
EST. AVERAGE GLUCOSE BLD GHB EST-MCNC: 97 MG/DL
HBA1C MFR BLD: 5 %

## 2023-07-05 DIAGNOSIS — F32.89 OTHER DEPRESSION: ICD-10-CM

## 2023-07-05 RX ORDER — DULOXETIN HYDROCHLORIDE 60 MG/1
60 CAPSULE, DELAYED RELEASE ORAL DAILY
Qty: 90 CAPSULE | Refills: 0 | Status: SHIPPED | OUTPATIENT
Start: 2023-07-05

## 2023-08-02 DIAGNOSIS — F32.89 OTHER DEPRESSION: ICD-10-CM

## 2023-08-02 RX ORDER — DULOXETIN HYDROCHLORIDE 60 MG/1
60 CAPSULE, DELAYED RELEASE ORAL DAILY
Qty: 90 CAPSULE | Refills: 0 | Status: SHIPPED | OUTPATIENT
Start: 2023-08-02

## 2023-08-17 DIAGNOSIS — N39.0 URINARY TRACT INFECTION WITHOUT HEMATURIA, SITE UNSPECIFIED: Primary | ICD-10-CM

## 2023-08-17 RX ORDER — NITROFURANTOIN 25; 75 MG/1; MG/1
100 CAPSULE ORAL 2 TIMES DAILY
Qty: 10 CAPSULE | Refills: 0 | Status: SHIPPED | OUTPATIENT
Start: 2023-08-17 | End: 2023-08-22

## 2023-11-13 DIAGNOSIS — F32.89 OTHER DEPRESSION: ICD-10-CM

## 2023-11-13 RX ORDER — DULOXETIN HYDROCHLORIDE 60 MG/1
60 CAPSULE, DELAYED RELEASE ORAL DAILY
Qty: 90 CAPSULE | Refills: 0 | Status: SHIPPED | OUTPATIENT
Start: 2023-11-13

## 2024-02-19 DIAGNOSIS — F32.89 OTHER DEPRESSION: ICD-10-CM

## 2024-02-19 RX ORDER — DULOXETIN HYDROCHLORIDE 60 MG/1
60 CAPSULE, DELAYED RELEASE ORAL DAILY
Qty: 90 CAPSULE | Refills: 1 | Status: SHIPPED | OUTPATIENT
Start: 2024-02-19

## 2024-06-17 ENCOUNTER — OFFICE VISIT (OUTPATIENT)
Dept: FAMILY MEDICINE CLINIC | Facility: CLINIC | Age: 39
End: 2024-06-17
Payer: COMMERCIAL

## 2024-06-17 VITALS
HEART RATE: 74 BPM | DIASTOLIC BLOOD PRESSURE: 62 MMHG | BODY MASS INDEX: 21.21 KG/M2 | SYSTOLIC BLOOD PRESSURE: 115 MMHG | HEIGHT: 69 IN | WEIGHT: 143.2 LBS | OXYGEN SATURATION: 98 %

## 2024-06-17 DIAGNOSIS — K21.9 GASTROESOPHAGEAL REFLUX DISEASE WITHOUT ESOPHAGITIS: ICD-10-CM

## 2024-06-17 DIAGNOSIS — M54.50 CHRONIC RIGHT-SIDED LOW BACK PAIN WITHOUT SCIATICA: ICD-10-CM

## 2024-06-17 DIAGNOSIS — L65.9 HAIR LOSS: ICD-10-CM

## 2024-06-17 DIAGNOSIS — F41.9 ANXIETY: ICD-10-CM

## 2024-06-17 DIAGNOSIS — R53.83 OTHER FATIGUE: ICD-10-CM

## 2024-06-17 DIAGNOSIS — L67.9 HAIR ABNORMALITY: ICD-10-CM

## 2024-06-17 DIAGNOSIS — Z00.00 ANNUAL PHYSICAL EXAM: Primary | ICD-10-CM

## 2024-06-17 DIAGNOSIS — Z00.8 OTHER SPECIFIED GENERAL MEDICAL EXAMINATION: ICD-10-CM

## 2024-06-17 DIAGNOSIS — G89.29 CHRONIC RIGHT-SIDED LOW BACK PAIN WITHOUT SCIATICA: ICD-10-CM

## 2024-06-17 DIAGNOSIS — F33.0 MILD EPISODE OF RECURRENT MAJOR DEPRESSIVE DISORDER (HCC): ICD-10-CM

## 2024-06-17 DIAGNOSIS — Z83.49 FAMILY HISTORY OF THYROID DISORDER: ICD-10-CM

## 2024-06-17 PROCEDURE — 99395 PREV VISIT EST AGE 18-39: CPT | Performed by: NURSE PRACTITIONER

## 2024-06-17 PROCEDURE — 99213 OFFICE O/P EST LOW 20 MIN: CPT | Performed by: NURSE PRACTITIONER

## 2024-06-17 NOTE — PROGRESS NOTES
"Adult Annual Physical  Name: Fern Lane      : 1985      MRN: [id number]  Encounter Provider: SALTY Ryan  Encounter Date: 2024   Encounter department: [facility] PRIMARY CARE    Assessment & Plan   1. Hair loss  2. Other fatigue  3. Annual physical exam    Immunizations and preventive care screenings were discussed with patient today. Appropriate education was printed on patient's after visit summary.    Counseling:  Acute issues     Will add lab work to check b12, vt d, iron levels,and TSH.  She is also with fatigue.      History of Present Illness     Adult Annual Physical:  Patient presents for annual physical. Reports hair has been brittle for over a year - has tried many products with no improvement.  .     Diet and Physical Activity:  - Diet/Nutrition: well balanced diet.  - Exercise: no formal exercise.    Depression Screening:    - PHQ-9 Score: 4    General Health:  - Sleep: sleeps well.  - Hearing: normal hearing right ear and normal hearing left ear.  - Vision: no vision problems.  - Dental: regular dental visits.    /GYN Health:  - Follows with GYN: yes.   - Menopause: premenopausal.   - History of STDs: no  - Contraception: IUD placement.      Advanced Care Planning:  - Has an advanced directive?: no    - Has a durable medical POA?: no    - ACP document given to patient?: no      Review of Systems  Current Outpatient Medications on File Prior to Visit   Medication Sig Dispense Refill    DULoxetine (CYMBALTA) 60 mg delayed release capsule Take 1 capsule (60 mg total) by mouth daily 90 capsule 1    levonorgestrel (MIRENA) 20 MCG/24HR IUD 1 each by Intrauterine route       No current facility-administered medications on file prior to visit.        Objective     /62 (BP Location: Left arm, Patient Position: Sitting, Cuff Size: Standard)   Pulse 74   Ht 5' 9\" (1.753 m)   Wt 65 kg (143 lb 3.2 oz)   SpO2 98%   BMI 21.15 kg/m²     Physical " Exam  Administrative Statements

## 2024-07-12 ENCOUNTER — APPOINTMENT (OUTPATIENT)
Dept: LAB | Facility: HOSPITAL | Age: 39
End: 2024-07-12
Payer: COMMERCIAL

## 2024-07-12 DIAGNOSIS — R53.83 OTHER FATIGUE: ICD-10-CM

## 2024-07-12 DIAGNOSIS — Z83.49 FAMILY HISTORY OF THYROID DISORDER: ICD-10-CM

## 2024-07-12 DIAGNOSIS — Z00.8 ENCOUNTER FOR OTHER GENERAL EXAMINATION: ICD-10-CM

## 2024-07-12 DIAGNOSIS — L67.9 HAIR ABNORMALITY: ICD-10-CM

## 2024-07-12 DIAGNOSIS — L65.9 HAIR LOSS: ICD-10-CM

## 2024-07-12 DIAGNOSIS — Z00.8 OTHER SPECIFIED GENERAL MEDICAL EXAMINATION: ICD-10-CM

## 2024-07-12 DIAGNOSIS — K21.9 GASTROESOPHAGEAL REFLUX DISEASE WITHOUT ESOPHAGITIS: ICD-10-CM

## 2024-07-12 LAB
25(OH)D3 SERPL-MCNC: 85.5 NG/ML (ref 30–100)
ALBUMIN SERPL BCG-MCNC: 4.5 G/DL (ref 3.5–5)
ALP SERPL-CCNC: 45 U/L (ref 34–104)
ALT SERPL W P-5'-P-CCNC: 15 U/L (ref 7–52)
ANION GAP SERPL CALCULATED.3IONS-SCNC: 8 MMOL/L (ref 4–13)
AST SERPL W P-5'-P-CCNC: 17 U/L (ref 13–39)
BASOPHILS # BLD AUTO: 0.03 THOUSANDS/ÂΜL (ref 0–0.1)
BASOPHILS NFR BLD AUTO: 1 % (ref 0–1)
BILIRUB SERPL-MCNC: 1.21 MG/DL (ref 0.2–1)
BUN SERPL-MCNC: 17 MG/DL (ref 5–25)
CALCIUM SERPL-MCNC: 9.4 MG/DL (ref 8.4–10.2)
CHLORIDE SERPL-SCNC: 102 MMOL/L (ref 96–108)
CHOLEST SERPL-MCNC: 132 MG/DL
CO2 SERPL-SCNC: 27 MMOL/L (ref 21–32)
CREAT SERPL-MCNC: 0.74 MG/DL (ref 0.6–1.3)
EOSINOPHIL # BLD AUTO: 0.11 THOUSAND/ÂΜL (ref 0–0.61)
EOSINOPHIL NFR BLD AUTO: 2 % (ref 0–6)
ERYTHROCYTE [DISTWIDTH] IN BLOOD BY AUTOMATED COUNT: 11.5 % (ref 11.6–15.1)
EST. AVERAGE GLUCOSE BLD GHB EST-MCNC: 97 MG/DL
FERRITIN SERPL-MCNC: 99 NG/ML (ref 11–307)
GFR SERPL CREATININE-BSD FRML MDRD: 102 ML/MIN/1.73SQ M
GLUCOSE P FAST SERPL-MCNC: 86 MG/DL (ref 65–99)
HBA1C MFR BLD: 5 %
HCT VFR BLD AUTO: 40.8 % (ref 34.8–46.1)
HDLC SERPL-MCNC: 39 MG/DL
HGB BLD-MCNC: 14 G/DL (ref 11.5–15.4)
IMM GRANULOCYTES # BLD AUTO: 0.01 THOUSAND/UL (ref 0–0.2)
IMM GRANULOCYTES NFR BLD AUTO: 0 % (ref 0–2)
IRON SATN MFR SERPL: 47 % (ref 15–50)
IRON SERPL-MCNC: 157 UG/DL (ref 50–212)
LDLC SERPL CALC-MCNC: 80 MG/DL (ref 0–100)
LYMPHOCYTES # BLD AUTO: 2.12 THOUSANDS/ÂΜL (ref 0.6–4.47)
LYMPHOCYTES NFR BLD AUTO: 42 % (ref 14–44)
MCH RBC QN AUTO: 33.8 PG (ref 26.8–34.3)
MCHC RBC AUTO-ENTMCNC: 34.3 G/DL (ref 31.4–37.4)
MCV RBC AUTO: 99 FL (ref 82–98)
MONOCYTES # BLD AUTO: 0.66 THOUSAND/ÂΜL (ref 0.17–1.22)
MONOCYTES NFR BLD AUTO: 13 % (ref 4–12)
NEUTROPHILS # BLD AUTO: 2.08 THOUSANDS/ÂΜL (ref 1.85–7.62)
NEUTS SEG NFR BLD AUTO: 42 % (ref 43–75)
NONHDLC SERPL-MCNC: 93 MG/DL
NRBC BLD AUTO-RTO: 0 /100 WBCS
PLATELET # BLD AUTO: 226 THOUSANDS/UL (ref 149–390)
PMV BLD AUTO: 9.2 FL (ref 8.9–12.7)
POTASSIUM SERPL-SCNC: 4 MMOL/L (ref 3.5–5.3)
PROT SERPL-MCNC: 7.5 G/DL (ref 6.4–8.4)
RBC # BLD AUTO: 4.14 MILLION/UL (ref 3.81–5.12)
SODIUM SERPL-SCNC: 137 MMOL/L (ref 135–147)
T4 SERPL-MCNC: 8.34 UG/DL (ref 6.09–12.23)
TIBC SERPL-MCNC: 337 UG/DL (ref 250–450)
TRIGL SERPL-MCNC: 66 MG/DL
TSH SERPL DL<=0.05 MIU/L-ACNC: 1.15 UIU/ML (ref 0.45–4.5)
UIBC SERPL-MCNC: 180 UG/DL (ref 155–355)
VIT B12 SERPL-MCNC: 368 PG/ML (ref 180–914)
WBC # BLD AUTO: 5.01 THOUSAND/UL (ref 4.31–10.16)

## 2024-07-12 PROCEDURE — 84436 ASSAY OF TOTAL THYROXINE: CPT

## 2024-07-12 PROCEDURE — 82607 VITAMIN B-12: CPT

## 2024-07-12 PROCEDURE — 82728 ASSAY OF FERRITIN: CPT

## 2024-07-12 PROCEDURE — 80053 COMPREHEN METABOLIC PANEL: CPT

## 2024-07-12 PROCEDURE — 83550 IRON BINDING TEST: CPT

## 2024-07-12 PROCEDURE — 84443 ASSAY THYROID STIM HORMONE: CPT

## 2024-07-12 PROCEDURE — 83036 HEMOGLOBIN GLYCOSYLATED A1C: CPT

## 2024-07-12 PROCEDURE — 82306 VITAMIN D 25 HYDROXY: CPT

## 2024-07-12 PROCEDURE — 83540 ASSAY OF IRON: CPT

## 2024-07-12 PROCEDURE — 80061 LIPID PANEL: CPT

## 2024-07-12 PROCEDURE — 36415 COLL VENOUS BLD VENIPUNCTURE: CPT

## 2024-07-12 PROCEDURE — 85025 COMPLETE CBC W/AUTO DIFF WBC: CPT

## 2024-08-29 DIAGNOSIS — F32.89 OTHER DEPRESSION: ICD-10-CM

## 2024-08-29 RX ORDER — DULOXETIN HYDROCHLORIDE 60 MG/1
60 CAPSULE, DELAYED RELEASE ORAL DAILY
Qty: 90 CAPSULE | Refills: 1 | Status: SHIPPED | OUTPATIENT
Start: 2024-08-29

## 2024-10-07 ENCOUNTER — TELEPHONE (OUTPATIENT)
Age: 39
End: 2024-10-07

## 2024-10-07 DIAGNOSIS — Z23 NEED FOR COVID-19 VACCINE: Primary | ICD-10-CM

## 2024-10-07 NOTE — TELEPHONE ENCOUNTER
Pt called and scheduled Flu shot 10/15 and is interested in Covid shot also.    Confirmed out of stock and ordered.    Patient called requesting Covid vaccine(s) for the following reason(s): Same time as flu shot.     No order in system.     Pt is interested in getting shot at the same time as the flu shot on 10/15    Please advise and/or schedule accordingly.

## 2024-10-16 ENCOUNTER — IMMUNIZATIONS (OUTPATIENT)
Dept: FAMILY MEDICINE CLINIC | Facility: CLINIC | Age: 39
End: 2024-10-16
Payer: COMMERCIAL

## 2024-10-16 DIAGNOSIS — Z23 FLU VACCINE NEED: Primary | ICD-10-CM

## 2024-10-16 PROCEDURE — 91320 SARSCV2 VAC 30MCG TRS-SUC IM: CPT

## 2024-10-16 PROCEDURE — 90656 IIV3 VACC NO PRSV 0.5 ML IM: CPT

## 2024-10-16 PROCEDURE — 90480 ADMN SARSCOV2 VAC 1/ONLY CMP: CPT

## 2024-10-16 PROCEDURE — 90471 IMMUNIZATION ADMIN: CPT

## 2024-10-16 NOTE — PROGRESS NOTES
Pt had pfizer Covid vaccine given in left deltoid and Fluzone flu vaccine given in right deltoid. Patient tolerated well. No further concerns.   
5

## 2024-10-28 ENCOUNTER — APPOINTMENT (OUTPATIENT)
Dept: LAB | Facility: HOSPITAL | Age: 39
End: 2024-10-28
Payer: COMMERCIAL

## 2024-10-28 DIAGNOSIS — Z00.8 OTHER SPECIFIED GENERAL MEDICAL EXAMINATION: ICD-10-CM

## 2024-10-28 LAB
CHOLEST SERPL-MCNC: 141 MG/DL
EST. AVERAGE GLUCOSE BLD GHB EST-MCNC: 97 MG/DL
HBA1C MFR BLD: 5 %
HDLC SERPL-MCNC: 47 MG/DL
LDLC SERPL CALC-MCNC: 82 MG/DL (ref 0–100)
NONHDLC SERPL-MCNC: 94 MG/DL
TRIGL SERPL-MCNC: 61 MG/DL

## 2024-10-28 PROCEDURE — 83036 HEMOGLOBIN GLYCOSYLATED A1C: CPT

## 2024-10-28 PROCEDURE — 36415 COLL VENOUS BLD VENIPUNCTURE: CPT

## 2024-10-28 PROCEDURE — 80061 LIPID PANEL: CPT

## 2025-03-26 DIAGNOSIS — F32.89 OTHER DEPRESSION: ICD-10-CM

## 2025-03-27 RX ORDER — DULOXETIN HYDROCHLORIDE 60 MG/1
60 CAPSULE, DELAYED RELEASE ORAL DAILY
Qty: 90 CAPSULE | Refills: 0 | Status: SHIPPED | OUTPATIENT
Start: 2025-03-27

## 2025-06-23 ENCOUNTER — APPOINTMENT (OUTPATIENT)
Dept: RADIOLOGY | Facility: CLINIC | Age: 40
End: 2025-06-23
Payer: COMMERCIAL

## 2025-06-23 ENCOUNTER — APPOINTMENT (OUTPATIENT)
Dept: LAB | Facility: HOSPITAL | Age: 40
End: 2025-06-23
Payer: COMMERCIAL

## 2025-06-23 ENCOUNTER — OFFICE VISIT (OUTPATIENT)
Dept: FAMILY MEDICINE CLINIC | Facility: CLINIC | Age: 40
End: 2025-06-23
Payer: COMMERCIAL

## 2025-06-23 ENCOUNTER — APPOINTMENT (OUTPATIENT)
Dept: LAB | Facility: HOSPITAL | Age: 40
End: 2025-06-23
Attending: PREVENTIVE MEDICINE
Payer: COMMERCIAL

## 2025-06-23 VITALS
HEART RATE: 93 BPM | DIASTOLIC BLOOD PRESSURE: 64 MMHG | SYSTOLIC BLOOD PRESSURE: 108 MMHG | OXYGEN SATURATION: 98 % | HEIGHT: 69 IN | BODY MASS INDEX: 21.24 KG/M2 | WEIGHT: 143.4 LBS

## 2025-06-23 DIAGNOSIS — K59.09 OTHER CONSTIPATION: ICD-10-CM

## 2025-06-23 DIAGNOSIS — R14.0 ABDOMINAL BLOATING: ICD-10-CM

## 2025-06-23 DIAGNOSIS — F32.89 OTHER DEPRESSION: ICD-10-CM

## 2025-06-23 DIAGNOSIS — R53.83 OTHER FATIGUE: ICD-10-CM

## 2025-06-23 DIAGNOSIS — Z00.00 ANNUAL PHYSICAL EXAM: Primary | ICD-10-CM

## 2025-06-23 DIAGNOSIS — K21.9 GASTROESOPHAGEAL REFLUX DISEASE WITHOUT ESOPHAGITIS: ICD-10-CM

## 2025-06-23 DIAGNOSIS — Z00.8 HEALTH EXAMINATION IN POPULATION SURVEY: ICD-10-CM

## 2025-06-23 LAB
ALBUMIN SERPL BCG-MCNC: 4.3 G/DL (ref 3.5–5)
ALP SERPL-CCNC: 41 U/L (ref 34–104)
ALT SERPL W P-5'-P-CCNC: 16 U/L (ref 7–52)
ANION GAP SERPL CALCULATED.3IONS-SCNC: 6 MMOL/L (ref 4–13)
AST SERPL W P-5'-P-CCNC: 16 U/L (ref 13–39)
BASOPHILS # BLD AUTO: 0.03 THOUSANDS/ÂΜL (ref 0–0.1)
BASOPHILS NFR BLD AUTO: 1 % (ref 0–1)
BILIRUB SERPL-MCNC: 0.58 MG/DL (ref 0.2–1)
BUN SERPL-MCNC: 17 MG/DL (ref 5–25)
CALCIUM SERPL-MCNC: 9.2 MG/DL (ref 8.4–10.2)
CHLORIDE SERPL-SCNC: 106 MMOL/L (ref 96–108)
CHOLEST SERPL-MCNC: 139 MG/DL (ref ?–200)
CO2 SERPL-SCNC: 26 MMOL/L (ref 21–32)
CREAT SERPL-MCNC: 0.85 MG/DL (ref 0.6–1.3)
EOSINOPHIL # BLD AUTO: 0.18 THOUSAND/ÂΜL (ref 0–0.61)
EOSINOPHIL NFR BLD AUTO: 3 % (ref 0–6)
ERYTHROCYTE [DISTWIDTH] IN BLOOD BY AUTOMATED COUNT: 12 % (ref 11.6–15.1)
EST. AVERAGE GLUCOSE BLD GHB EST-MCNC: 97 MG/DL
GFR SERPL CREATININE-BSD FRML MDRD: 85 ML/MIN/1.73SQ M
GLUCOSE P FAST SERPL-MCNC: 89 MG/DL (ref 65–99)
HBA1C MFR BLD: 5 %
HCT VFR BLD AUTO: 39.5 % (ref 34.8–46.1)
HDLC SERPL-MCNC: 39 MG/DL
HGB BLD-MCNC: 13.1 G/DL (ref 11.5–15.4)
IMM GRANULOCYTES # BLD AUTO: 0.02 THOUSAND/UL (ref 0–0.2)
IMM GRANULOCYTES NFR BLD AUTO: 0 % (ref 0–2)
LDLC SERPL CALC-MCNC: 74 MG/DL (ref 0–100)
LYMPHOCYTES # BLD AUTO: 2.17 THOUSANDS/ÂΜL (ref 0.6–4.47)
LYMPHOCYTES NFR BLD AUTO: 38 % (ref 14–44)
MCH RBC QN AUTO: 33.5 PG (ref 26.8–34.3)
MCHC RBC AUTO-ENTMCNC: 33.2 G/DL (ref 31.4–37.4)
MCV RBC AUTO: 101 FL (ref 82–98)
MONOCYTES # BLD AUTO: 0.62 THOUSAND/ÂΜL (ref 0.17–1.22)
MONOCYTES NFR BLD AUTO: 11 % (ref 4–12)
NEUTROPHILS # BLD AUTO: 2.73 THOUSANDS/ÂΜL (ref 1.85–7.62)
NEUTS SEG NFR BLD AUTO: 47 % (ref 43–75)
NONHDLC SERPL-MCNC: 100 MG/DL
NRBC BLD AUTO-RTO: 0 /100 WBCS
PLATELET # BLD AUTO: 242 THOUSANDS/UL (ref 149–390)
PMV BLD AUTO: 9.4 FL (ref 8.9–12.7)
POTASSIUM SERPL-SCNC: 4.2 MMOL/L (ref 3.5–5.3)
PROT SERPL-MCNC: 7 G/DL (ref 6.4–8.4)
RBC # BLD AUTO: 3.91 MILLION/UL (ref 3.81–5.12)
SODIUM SERPL-SCNC: 138 MMOL/L (ref 135–147)
TRIGL SERPL-MCNC: 131 MG/DL (ref ?–150)
WBC # BLD AUTO: 5.75 THOUSAND/UL (ref 4.31–10.16)

## 2025-06-23 PROCEDURE — 83036 HEMOGLOBIN GLYCOSYLATED A1C: CPT

## 2025-06-23 PROCEDURE — 85025 COMPLETE CBC W/AUTO DIFF WBC: CPT

## 2025-06-23 PROCEDURE — 36415 COLL VENOUS BLD VENIPUNCTURE: CPT

## 2025-06-23 PROCEDURE — 80061 LIPID PANEL: CPT

## 2025-06-23 PROCEDURE — 74018 RADEX ABDOMEN 1 VIEW: CPT

## 2025-06-23 PROCEDURE — 99396 PREV VISIT EST AGE 40-64: CPT | Performed by: NURSE PRACTITIONER

## 2025-06-23 PROCEDURE — 80053 COMPREHEN METABOLIC PANEL: CPT

## 2025-06-23 RX ORDER — DULOXETIN HYDROCHLORIDE 60 MG/1
60 CAPSULE, DELAYED RELEASE ORAL DAILY
Qty: 90 CAPSULE | Refills: 1 | Status: SHIPPED | OUTPATIENT
Start: 2025-06-23

## 2025-06-23 NOTE — PROGRESS NOTES
Adult Annual Physical  Name: Fern Lane      : 1985      MRN: [id number]  Encounter Provider: SALTY Ryan  Encounter Date: 2025   Encounter department: [facility] PRIMARY CARE    :  Assessment & Plan  Annual physical exam         Gastroesophageal reflux disease without esophagitis         Abdominal bloating    Orders:  •  Comprehensive metabolic panel; Future  •  CBC and differential; Future    Other constipation    Orders:  •  CBC and differential; Future    Other depression  Depression Screening Follow-up Plan: Patient's depression screening was positive with a PHQ-9 score of 6. {Depression screen follow-up plan:2812673550}    Orders:  •  DULoxetine (CYMBALTA) 60 mg delayed release capsule; Take 1 capsule (60 mg total) by mouth daily    Other fatigue    Orders:  •  CBC and differential; Future            Immunizations:  - Immunizations due: Tdap         History of Present Illness   {?Quick Links Encounters * My Last Note * Last Note in Specialty * Snapshot * Since Last Visit * History :35266}  Adult Annual Physical:  Patient presents for annual physical.     Diet and Physical Activity:  - Diet/Nutrition: portion control.  - Exercise: vigorous cardiovascular exercise, 3-4 times a week on average and 30-60 minutes on average.    Depression Screening:    - PHQ-9 Score: 6    General Health:  - Sleep: 4-6 hours of sleep on average.  - Hearing: normal hearing bilateral ears.  - Vision: no vision problems.  - Dental: regular dental visits, brushes teeth twice daily and floss regularly.    /GYN Health:  - Follows with GYN: yes.   - Menopause: perimenopausal.   - History of STDs: no  - Contraception: IUD placement.      Advanced Care Planning:  - Has an advanced directive?: no    - Has a durable medical POA?: no      Review of Systems  {Select to Display PMH (Optional):69444}    Objective {?Quick Links Trend Vitals * Enter New Vitals * Results Review * Timeline (Adult) * Labs *  "Imaging * Cardiology * Procedures * Lung Cancer Screening * Surgical eConsent :49416}  /64 (BP Location: Right arm, Patient Position: Sitting, Cuff Size: Standard)   Pulse 93   Ht 5' 9\" (1.753 m)   Wt 65 kg (143 lb 6.4 oz)   SpO2 98%   BMI 21.18 kg/m²     Physical Exam  {Administrative / Billing Section (Optional):68893}  "

## 2025-06-23 NOTE — PROGRESS NOTES
Adult Annual Physical  Name: Fern Lane      : 1985      MRN: [id number]  Encounter Provider: SALTY Ryan  Encounter Date: 2025   [facility]    :  Assessment & Plan  Annual physical exam         Gastroesophageal reflux disease without esophagitis         Abdominal bloating    Orders:  •  Comprehensive metabolic panel; Future  •  CBC and differential; Future    Other constipation    Orders:  •  CBC and differential; Future    Other depression  Depression Screening Follow-up Plan: Patient's depression screening was positive with a PHQ-9 score of 6. Patient assessed for underlying major depression. They have no active suicidal ideations. Brief counseling provided and recommend additional follow-up/re-evaluation next office visit.    Orders:  •  DULoxetine (CYMBALTA) 60 mg delayed release capsule; Take 1 capsule (60 mg total) by mouth daily    Other fatigue    Orders:  •  CBC and differential; Future    Gastroesophageal reflux disease without esophagitis         Annual physical exam                   Immunizations:  - Immunizations due: Tdap         History of Present Illness     Adult Annual Physical:  Patient presents for annual physical. Here for her annual physical but also reports several months of consistent bloating and constipation.  This has not been her norm in the past.  She has tried different supplements with no effect.  She is also with some pain in her left upper quadrant.  .     Diet and Physical Activity:  - Diet/Nutrition: portion control.  - Exercise: vigorous cardiovascular exercise, 3-4 times a week on average and 30-60 minutes on average.    Depression Screening:    - PHQ-9 Score: 6    General Health:  - Sleep: 4-6 hours of sleep on average.  - Hearing: normal hearing bilateral ears.  - Vision: no vision problems.  - Dental: regular dental visits, brushes teeth twice daily and floss regularly.    /GYN Health:  - Follows  "with GYN: yes.   - Menopause: perimenopausal.   - History of STDs: no  - Contraception: IUD placement.      Advanced Care Planning:  - Has an advanced directive?: no    - Has a durable medical POA?: no      Review of Systems   Constitutional: Negative.    Respiratory: Negative.     Cardiovascular: Negative.    Gastrointestinal:  Positive for abdominal distention, abdominal pain and constipation.   All other systems reviewed and are negative.        Objective   /64 (BP Location: Right arm, Patient Position: Sitting, Cuff Size: Standard)   Pulse 93   Ht 5' 9\" (1.753 m)   Wt 65 kg (143 lb 6.4 oz)   SpO2 98%   BMI 21.18 kg/m²     Physical Exam  Constitutional:       Appearance: Normal appearance.     Cardiovascular:      Pulses: Normal pulses.      Heart sounds: Normal heart sounds.   Pulmonary:      Effort: Pulmonary effort is normal.      Breath sounds: Normal breath sounds.   Abdominal:      General: Abdomen is flat. Bowel sounds are decreased.      Palpations: Abdomen is soft.      Tenderness: There is abdominal tenderness in the left upper quadrant.     Neurological:      General: No focal deficit present.      Mental Status: She is alert and oriented to person, place, and time.         "

## 2025-06-23 NOTE — ASSESSMENT & PLAN NOTE
Depression Screening Follow-up Plan: Patient's depression screening was positive with a PHQ-9 score of 6. Patient assessed for underlying major depression. They have no active suicidal ideations. Brief counseling provided and recommend additional follow-up/re-evaluation next office visit.    Orders:  •  DULoxetine (CYMBALTA) 60 mg delayed release capsule; Take 1 capsule (60 mg total) by mouth daily

## 2025-06-23 NOTE — PATIENT INSTRUCTIONS
"Patient Education     Routine physical for adults   The Basics   Written by the doctors and editors at [facility]   What is a physical? -- A physical is a routine visit, or \"check-up,\" with your doctor. You might also hear it called a \"wellness visit\" or \"preventive visit.\"  During each visit, the doctor will:   Ask about your physical and mental health   Ask about your habits, behaviors, and lifestyle   Do an exam   Give you vaccines if needed   Talk to you about any medicines you take   Give advice about your health   Answer your questions  Getting regular check-ups is an important part of taking care of your health. It can help your doctor find and treat any problems you have. But it's also important for preventing health problems.  A routine physical is different from a \"sick visit.\" A sick visit is when you see a doctor because of a health concern or problem. Since physicals are scheduled ahead of time, you can think about what you want to ask the doctor.  How often should I get a physical? -- It depends on your age and health. In general, for people age 21 years and older:   If you are younger than 50 years, you might be able to get a physical every 3 years.   If you are 50 years or older, your doctor might recommend a physical every year.  If you have an ongoing health condition, like diabetes or high blood pressure, your doctor will probably want to see you more often.  What happens during a physical? -- In general, each visit will include:   Physical exam - The doctor or nurse will check your height, weight, heart rate, and blood pressure. They will also look at your eyes and ears. They will ask about how you are feeling and whether you have any symptoms that bother you.   Medicines - It's a good idea to bring a list of all the medicines you take to each doctor visit. Your doctor will talk to you about your medicines and answer any questions. Tell them if you are having any side effects that bother you. You " "should also tell them if you are having trouble paying for any of your medicines.   Habits and behaviors - This includes:   Your diet   Your exercise habits   Whether you smoke, drink alcohol, or use drugs   Whether you are sexually active   Whether you feel safe at home  Your doctor will talk to you about things you can do to improve your health and lower your risk of health problems. They will also offer help and support. For example, if you want to quit smoking, they can give you advice and might prescribe medicines. If you want to improve your diet or get more physical activity, they can help you with this, too.   Lab tests, if needed - The tests you get will depend on your age and situation. For example, your doctor might want to check your:   Cholesterol   Blood sugar   Iron level   Vaccines - The recommended vaccines will depend on your age, health, and what vaccines you already had. Vaccines are very important because they can prevent certain serious or deadly infections.   Discussion of screening - \"Screening\" means checking for diseases or other health problems before they cause symptoms. Your doctor can recommend screening based on your age, risk, and preferences. This might include tests to check for:   Cancer, such as breast, prostate, cervical, ovarian, colorectal, prostate, lung, or skin cancer   Sexually transmitted infections, such as chlamydia and gonorrhea   Mental health conditions like depression and anxiety  Your doctor will talk to you about the different types of screening tests. They can help you decide which screenings to have. They can also explain what the results might mean.   Answering questions - The physical is a good time to ask the doctor or nurse questions about your health. If needed, they can refer you to other doctors or specialists, too.  Adults older than 65 years often need other care, too. As you get older, your doctor will talk to you about:   How to prevent falling at " home   Hearing or vision tests   Memory testing   How to take your medicines safely   Making sure that you have the help and support you need at home  All topics are updated as new evidence becomes available and our peer review process is complete.  This topic retrieved from [employer] on: May 02, 2024.  Topic 656733 Version 1.0  Release: 32.4.3 - C32.122  © 2024 UpToDate, Inc. and/or its affiliates. All rights reserved.  Consumer Information Use and Disclaimer   Disclaimer: This generalized information is a limited summary of diagnosis, treatment, and/or medication information. It is not meant to be comprehensive and should be used as a tool to help the user understand and/or assess potential diagnostic and treatment options. It does NOT include all information about conditions, treatments, medications, side effects, or risks that may apply to a specific patient. It is not intended to be medical advice or a substitute for the medical advice, diagnosis, or treatment of a health care provider based on the health care provider's examination and assessment of a patient's specific and unique circumstances. Patients must speak with a health care provider for complete information about their health, medical questions, and treatment options, including any risks or benefits regarding use of medications. This information does not endorse any treatments or medications as safe, effective, or approved for treating a specific patient. UpToDate, Inc. and its affiliates disclaim any warranty or liability relating to this information or the use thereof.The use of this information is governed by the Terms of Use, available at https://www.woltersE2E Networksuwer.com/en/know/clinical-effectiveness-terms. 2024© UpToDate, Inc. and its affiliates and/or licensors. All rights reserved.  Copyright   © 2024 UpToDate, Inc. and/or its affiliates. All rights reserved.    Patient Education     Routine physical for adults   The Basics   Written by the  "doctors and editors at [facility]   What is a physical? -- A physical is a routine visit, or \"check-up,\" with your doctor. You might also hear it called a \"wellness visit\" or \"preventive visit.\"  During each visit, the doctor will:   Ask about your physical and mental health   Ask about your habits, behaviors, and lifestyle   Do an exam   Give you vaccines if needed   Talk to you about any medicines you take   Give advice about your health   Answer your questions  Getting regular check-ups is an important part of taking care of your health. It can help your doctor find and treat any problems you have. But it's also important for preventing health problems.  A routine physical is different from a \"sick visit.\" A sick visit is when you see a doctor because of a health concern or problem. Since physicals are scheduled ahead of time, you can think about what you want to ask the doctor.  How often should I get a physical? -- It depends on your age and health. In general, for people age 21 years and older:   If you are younger than 50 years, you might be able to get a physical every 3 years.   If you are 50 years or older, your doctor might recommend a physical every year.  If you have an ongoing health condition, like diabetes or high blood pressure, your doctor will probably want to see you more often.  What happens during a physical? -- In general, each visit will include:   Physical exam - The doctor or nurse will check your height, weight, heart rate, and blood pressure. They will also look at your eyes and ears. They will ask about how you are feeling and whether you have any symptoms that bother you.   Medicines - It's a good idea to bring a list of all the medicines you take to each doctor visit. Your doctor will talk to you about your medicines and answer any questions. Tell them if you are having any side effects that bother you. You should also tell them if you are having trouble paying for any of your " "medicines.   Habits and behaviors - This includes:   Your diet   Your exercise habits   Whether you smoke, drink alcohol, or use drugs   Whether you are sexually active   Whether you feel safe at home  Your doctor will talk to you about things you can do to improve your health and lower your risk of health problems. They will also offer help and support. For example, if you want to quit smoking, they can give you advice and might prescribe medicines. If you want to improve your diet or get more physical activity, they can help you with this, too.   Lab tests, if needed - The tests you get will depend on your age and situation. For example, your doctor might want to check your:   Cholesterol   Blood sugar   Iron level   Vaccines - The recommended vaccines will depend on your age, health, and what vaccines you already had. Vaccines are very important because they can prevent certain serious or deadly infections.   Discussion of screening - \"Screening\" means checking for diseases or other health problems before they cause symptoms. Your doctor can recommend screening based on your age, risk, and preferences. This might include tests to check for:   Cancer, such as breast, prostate, cervical, ovarian, colorectal, prostate, lung, or skin cancer   Sexually transmitted infections, such as chlamydia and gonorrhea   Mental health conditions like depression and anxiety  Your doctor will talk to you about the different types of screening tests. They can help you decide which screenings to have. They can also explain what the results might mean.   Answering questions - The physical is a good time to ask the doctor or nurse questions about your health. If needed, they can refer you to other doctors or specialists, too.  Adults older than 65 years often need other care, too. As you get older, your doctor will talk to you about:   How to prevent falling at home   Hearing or vision tests   Memory testing   How to take your " medicines safely   Making sure that you have the help and support you need at home  All topics are updated as new evidence becomes available and our peer review process is complete.  This topic retrieved from [employer] on: May 02, 2024.  Topic 520396 Version 1.0  Release: 32.4.3 - C32.122  © 2024 UpToDate, Inc. and/or its affiliates. All rights reserved.  Consumer Information Use and Disclaimer   Disclaimer: This generalized information is a limited summary of diagnosis, treatment, and/or medication information. It is not meant to be comprehensive and should be used as a tool to help the user understand and/or assess potential diagnostic and treatment options. It does NOT include all information about conditions, treatments, medications, side effects, or risks that may apply to a specific patient. It is not intended to be medical advice or a substitute for the medical advice, diagnosis, or treatment of a health care provider based on the health care provider's examination and assessment of a patient's specific and unique circumstances. Patients must speak with a health care provider for complete information about their health, medical questions, and treatment options, including any risks or benefits regarding use of medications. This information does not endorse any treatments or medications as safe, effective, or approved for treating a specific patient. UpToDate, Inc. and its affiliates disclaim any warranty or liability relating to this information or the use thereof.The use of this information is governed by the Terms of Use, available at https://www.woltersTryolabsuwer.com/en/know/clinical-effectiveness-terms. 2024© UpToDate, Inc. and its affiliates and/or licensors. All rights reserved.  Copyright   © 2024 UpToDate, Inc. and/or its affiliates. All rights reserved.

## 2025-06-24 NOTE — ASSESSMENT & PLAN NOTE
Depression Screening Follow-up Plan: Patient's depression screening was positive with a PHQ-9 score of 6. {Depression screen follow-up plan:[id number]}    Orders:  •  DULoxetine (CYMBALTA) 60 mg delayed release capsule; Take 1 capsule (60 mg total) by mouth daily

## 2025-07-30 DIAGNOSIS — E34.9 HORMONE IMBALANCE: Primary | ICD-10-CM
